# Patient Record
Sex: FEMALE | Race: WHITE | HISPANIC OR LATINO
[De-identification: names, ages, dates, MRNs, and addresses within clinical notes are randomized per-mention and may not be internally consistent; named-entity substitution may affect disease eponyms.]

---

## 2022-11-30 PROBLEM — Z00.00 ENCOUNTER FOR PREVENTIVE HEALTH EXAMINATION: Status: ACTIVE | Noted: 2022-11-30

## 2023-02-17 ENCOUNTER — APPOINTMENT (OUTPATIENT)
Dept: NEUROLOGY | Facility: CLINIC | Age: 78
End: 2023-02-17
Payer: MEDICARE

## 2023-02-17 ENCOUNTER — LABORATORY RESULT (OUTPATIENT)
Age: 78
End: 2023-02-17

## 2023-02-17 VITALS
BODY MASS INDEX: 19.12 KG/M2 | HEIGHT: 64 IN | HEART RATE: 68 BPM | OXYGEN SATURATION: 98 % | WEIGHT: 112 LBS | DIASTOLIC BLOOD PRESSURE: 108 MMHG | TEMPERATURE: 97.8 F | SYSTOLIC BLOOD PRESSURE: 178 MMHG

## 2023-02-17 DIAGNOSIS — I48.91 UNSPECIFIED ATRIAL FIBRILLATION: ICD-10-CM

## 2023-02-17 DIAGNOSIS — I10 ESSENTIAL (PRIMARY) HYPERTENSION: ICD-10-CM

## 2023-02-17 DIAGNOSIS — F32.A DEPRESSION, UNSPECIFIED: ICD-10-CM

## 2023-02-17 DIAGNOSIS — F03.90 UNSPECIFIED DEMENTIA W/OUT BEHAVIORAL DISTURBANCE: ICD-10-CM

## 2023-02-17 PROCEDURE — 99205 OFFICE O/P NEW HI 60 MIN: CPT

## 2023-02-17 RX ORDER — DONEPEZIL HYDROCHLORIDE 10 MG/1
10 TABLET ORAL
Qty: 90 | Refills: 2 | Status: ACTIVE | COMMUNITY
Start: 1900-01-01 | End: 1900-01-01

## 2023-02-17 RX ORDER — METOPROLOL SUCCINATE 50 MG/1
50 TABLET, EXTENDED RELEASE ORAL
Refills: 0 | Status: ACTIVE | COMMUNITY

## 2023-02-17 RX ORDER — PAROXETINE HYDROCHLORIDE 10 MG/1
10 TABLET, FILM COATED ORAL
Refills: 0 | Status: ACTIVE | COMMUNITY

## 2023-02-17 RX ORDER — CHROMIUM 200 MCG
TABLET ORAL
Refills: 0 | Status: ACTIVE | COMMUNITY

## 2023-02-17 RX ORDER — DIVALPROEX SODIUM 125 MG/1
125 CAPSULE, COATED PELLETS ORAL
Qty: 90 | Refills: 5 | Status: ACTIVE | COMMUNITY
Start: 2023-02-17 | End: 1900-01-01

## 2023-02-17 RX ORDER — APIXABAN 5 MG/1
5 TABLET, FILM COATED ORAL
Refills: 0 | Status: ACTIVE | COMMUNITY

## 2023-02-17 RX ORDER — ALENDRONATE SODIUM 70 MG/1
70 TABLET ORAL
Refills: 0 | Status: ACTIVE | COMMUNITY

## 2023-02-17 RX ORDER — LOSARTAN POTASSIUM 50 MG/1
50 TABLET, FILM COATED ORAL
Refills: 0 | Status: ACTIVE | COMMUNITY

## 2023-02-17 RX ORDER — MEMANTINE HYDROCHLORIDE 5 MG/1
5 TABLET, FILM COATED ORAL
Refills: 0 | Status: ACTIVE | COMMUNITY

## 2023-02-17 NOTE — PHYSICAL EXAM
[General Appearance - Alert] : alert [General Appearance - Well Nourished] : well nourished [General Appearance - Well Developed] : well developed [Affect] : the affect was normal [Person] : oriented to person [Place] : oriented to place [Time] : disoriented to time [Fluency] : fluency intact [Comprehension] : comprehension intact [Cranial Nerves Oculomotor (III)] : extraocular motion intact [Cranial Nerves Trigeminal (V)] : facial sensation intact symmetrically [Cranial Nerves Facial (VII)] : face symmetrical [Cranial Nerves Vestibulocochlear (VIII)] : hearing was intact bilaterally [Cranial Nerves Glossopharyngeal (IX)] : tongue and palate midline [Cranial Nerves Accessory (XI - Cranial And Spinal)] : head turning and shoulder shrug symmetric [Cranial Nerves Hypoglossal (XII)] : there was no tongue deviation with protrusion [Peripheral Vision Was Full To Confrontation Bilaterally] : peripheral vision was full to confrontation bilaterally [Motor Tone] : muscle tone was normal in all four extremities [Motor Strength] : muscle strength was normal in all four extremities [Paresis Pronator Drift Right-Sided] : no pronator drift on the right [Paresis Pronator Drift Left-Sided] : no pronator drift on the left [Sensation Tactile Decrease] : light touch was intact [Abnormal Walk] : normal gait [Coordination - Dysmetria Impaired Finger-to-Nose Bilateral] : not present [2+] : Patella left 2+ [FreeTextEntry4] : MoCA 8/30- uploaded in chart [Respiration, Rhythm And Depth] : normal respiratory rhythm and effort [Heart Sounds] : normal S1 and S2 [Arterial Pulses Carotid] : carotid pulses were normal with no bruits

## 2023-02-17 NOTE — REASON FOR VISIT
[Initial Eval - Existing Diagnosis] : an initial evaluation of an existing diagnosis [Family Member] : family member [FreeTextEntry1] : dementia

## 2023-02-17 NOTE — DISCUSSION/SUMMARY
[FreeTextEntry1] : Pt is a 76 yo F, primarily Yi-speaking, with PMhx of afib on eliquis, HTN, depression, dementia, s/p b/l cataract surgery and OD cornea transplant, who presents today with her granddaughter (who is translating) to establish care.\par \par # Dementia: likely AD vs vascular, MoCA 8/30, moderate-severe impairment. \par - MRI brain without contrast\par - Labs\par - Neuropsych testing\par - Increase donepezil to 10mg daily\par - Continue memantine 5mg daily\par - Start VPA sprinkles 125mg TID\par \par \par RTC in 4 mo

## 2023-02-17 NOTE — HISTORY OF PRESENT ILLNESS
[FreeTextEntry1] : Pt is a 76 yo F, primarily Lao-speaking, with PMhx of afib on eliquis, HTN, depression, dementia, s/p b/l cataract surgery and OD cornea transplant, who presents today with her granddaughter (who is translating) to establish care. She began showing signs of dementia about 4 years ago, was diagnosed in [place]. She has since been moved to lives in [place] with family due to declining mentation/memory. Poor short term memory, also forgetting familiar family members. She gets lost easily, has become more emotional and easily agitated. She fatigues easily, refuses help but granddaughter states that she does need help with ADL's. Does not use any ambulatory assist devices. She has been on aricept and memantine at current doses for a couple of years. Denies any recent falls. Occasional auditory hallucinations. No known h/o stroke or family h/o dementia.

## 2023-02-17 NOTE — REVIEW OF SYSTEMS
[Feeling Tired] : feeling tired [As Noted in HPI] : as noted in HPI [Change In Personality] : personality change [Eyesight Problems] : eyesight problems [Heart Rate Is Fast] : fast heart rate [SOB on Exertion] : shortness of breath during exertion [Negative] : Musculoskeletal

## 2023-02-21 LAB
ALBUMIN MFR SERPL ELPH: 55.8 %
ALBUMIN SERPL ELPH-MCNC: 4.6 G/DL
ALBUMIN SERPL-MCNC: 4.3 G/DL
ALBUMIN/GLOB SERPL: 1.3 RATIO
ALP BLD-CCNC: 128 U/L
ALPHA1 GLOB MFR SERPL ELPH: 4.7 %
ALPHA1 GLOB SERPL ELPH-MCNC: 0.4 G/DL
ALPHA2 GLOB MFR SERPL ELPH: 9 %
ALPHA2 GLOB SERPL ELPH-MCNC: 0.7 G/DL
ALT SERPL-CCNC: 15 U/L
ANION GAP SERPL CALC-SCNC: 12 MMOL/L
AST SERPL-CCNC: 28 U/L
B-GLOBULIN MFR SERPL ELPH: 11.3 %
B-GLOBULIN SERPL ELPH-MCNC: 0.9 G/DL
BILIRUB SERPL-MCNC: 0.5 MG/DL
BUN SERPL-MCNC: 19 MG/DL
CALCIUM SERPL-MCNC: 10 MG/DL
CHLORIDE SERPL-SCNC: 104 MMOL/L
CO2 SERPL-SCNC: 27 MMOL/L
CREAT SERPL-MCNC: 1 MG/DL
EGFR: 58 ML/MIN/1.73M2
FOLATE SERPL-MCNC: >20 NG/ML
GAMMA GLOB FLD ELPH-MCNC: 1.5 G/DL
GAMMA GLOB MFR SERPL ELPH: 19.2 %
GLUCOSE SERPL-MCNC: 88 MG/DL
INTERPRETATION SERPL IEP-IMP: NORMAL
POTASSIUM SERPL-SCNC: 3.8 MMOL/L
PROT SERPL-MCNC: 7.5 G/DL
PROT SERPL-MCNC: 7.7 G/DL
PROT SERPL-MCNC: 7.7 G/DL
SODIUM SERPL-SCNC: 143 MMOL/L
T PALLIDUM AB SER QL IA: NEGATIVE
T4 SERPL-MCNC: 5.5 UG/DL
TSH SERPL-ACNC: 1.26 UIU/ML
VIT B12 SERPL-MCNC: >2000 PG/ML

## 2023-03-01 LAB — VIT B1 SERPL-MCNC: 196.9 NMOL/L

## 2023-03-17 ENCOUNTER — EMERGENCY (EMERGENCY)
Facility: HOSPITAL | Age: 78
LOS: 0 days | Discharge: HOME | End: 2023-03-17
Attending: EMERGENCY MEDICINE
Payer: MEDICARE

## 2023-03-17 VITALS
RESPIRATION RATE: 12 BRPM | DIASTOLIC BLOOD PRESSURE: 58 MMHG | TEMPERATURE: 97 F | HEIGHT: 62 IN | OXYGEN SATURATION: 94 % | WEIGHT: 139.99 LBS | HEART RATE: 64 BPM | SYSTOLIC BLOOD PRESSURE: 101 MMHG

## 2023-03-17 VITALS
SYSTOLIC BLOOD PRESSURE: 99 MMHG | OXYGEN SATURATION: 95 % | RESPIRATION RATE: 14 BRPM | DIASTOLIC BLOOD PRESSURE: 61 MMHG | HEART RATE: 72 BPM

## 2023-03-17 DIAGNOSIS — M79.89 OTHER SPECIFIED SOFT TISSUE DISORDERS: ICD-10-CM

## 2023-03-17 DIAGNOSIS — M06.9 RHEUMATOID ARTHRITIS, UNSPECIFIED: ICD-10-CM

## 2023-03-17 DIAGNOSIS — M10.9 GOUT, UNSPECIFIED: ICD-10-CM

## 2023-03-17 DIAGNOSIS — E78.5 HYPERLIPIDEMIA, UNSPECIFIED: ICD-10-CM

## 2023-03-17 DIAGNOSIS — I10 ESSENTIAL (PRIMARY) HYPERTENSION: ICD-10-CM

## 2023-03-17 DIAGNOSIS — F03.90 UNSPECIFIED DEMENTIA, UNSPECIFIED SEVERITY, WITHOUT BEHAVIORAL DISTURBANCE, PSYCHOTIC DISTURBANCE, MOOD DISTURBANCE, AND ANXIETY: ICD-10-CM

## 2023-03-17 DIAGNOSIS — M25.531 PAIN IN RIGHT WRIST: ICD-10-CM

## 2023-03-17 PROCEDURE — 73130 X-RAY EXAM OF HAND: CPT | Mod: 26,RT

## 2023-03-17 PROCEDURE — 73130 X-RAY EXAM OF HAND: CPT | Mod: RT

## 2023-03-17 PROCEDURE — 73110 X-RAY EXAM OF WRIST: CPT | Mod: 26,RT

## 2023-03-17 PROCEDURE — 99284 EMERGENCY DEPT VISIT MOD MDM: CPT | Mod: GC

## 2023-03-17 PROCEDURE — 73090 X-RAY EXAM OF FOREARM: CPT | Mod: 26,RT

## 2023-03-17 PROCEDURE — 73110 X-RAY EXAM OF WRIST: CPT | Mod: RT

## 2023-03-17 PROCEDURE — 99284 EMERGENCY DEPT VISIT MOD MDM: CPT | Mod: 25

## 2023-03-17 PROCEDURE — 73090 X-RAY EXAM OF FOREARM: CPT | Mod: RT

## 2023-03-17 NOTE — ED ADULT NURSE NOTE - NSIMPLEMENTINTERV_GEN_ALL_ED
Implemented All Fall with Harm Risk Interventions:  [place] to call system. Call bell, personal items and telephone within reach. Instruct patient to call for assistance. Room bathroom lighting operational. Non-slip footwear when patient is off stretcher. Physically safe environment: no spills, clutter or unnecessary equipment. Stretcher in lowest position, wheels locked, appropriate side rails in place. Provide visual cue, wrist band, yellow gown, etc. Monitor gait and stability. Monitor for mental status changes and reorient to person, place, and time. Review medications for side effects contributing to fall risk. Reinforce activity limits and safety measures with patient and family. Provide visual clues: red socks.

## 2023-03-17 NOTE — ED PROVIDER NOTE - NSFOLLOWUPCLINICS_GEN_ALL_ED_FT
[facility]  Orthpedic  [address], [place]   Phone: [phone]  Fax:   Follow Up Time: 4-6 Days

## 2023-03-17 NOTE — ED PROVIDER NOTE - CARE PROVIDER_API CALL
Giovanny Shell)  Orthopaedic Surgery  [address] [place] [address]  Phone: [phone]  Fax: [phone]  Follow Up Time: 4-6 Days

## 2023-03-17 NOTE — ED PROVIDER NOTE - ATTENDING CONTRIBUTION TO CARE
76 yo F pmh of a fib, alzheimer, RA, HTN, HLD presents right swelling to the right wrist. As per nursing home they noted some swelling to the right wrist for the last week. Had an xray done that shows a possible fx so sent in for evaluation. Patient currently A&Ox3 but not sure how she may have injured her wrist. patient also currently on abx for a UTI.     CONSTITUTIONAL: Well-developed; well-nourished; in no acute distress.   SKIN: warm, dry  HEAD: Normocephalic; atraumatic.  EYES: PERRL, EOMI, no conjunctival erythema  ENT: No nasal discharge; airway clear.  NECK: Supple; non tender.  CARD: S1, S2 normal;  Regular rate and rhythm.   RESP: No wheezes, rales or rhonchi.  ABD: soft non tender, non distended, no rebound or guarding  EXT: + edema and tenderness to the medial aspect of the right wrist. 5/5 strength. 2+ puleses. Neurovascularly intact.   LYMPH: No acute cervical adenopathy.  NEURO: Alert, oriented, grossly unremarkable. neurovascularly intact  PSYCH: Cooperative, appropriate.

## 2023-03-17 NOTE — ED PROVIDER NOTE - PHYSICAL EXAMINATION
VITAL SIGNS: I have reviewed nursing notes and confirm.  CONSTITUTIONAL: well-appearing, non-toxic, NAD  SKIN: Warm dry, normal skin turgor  HEAD: NCAT  EYES: EOMI, PERRLA, no scleral icterus  ENT: Moist mucous membranes, normal pharynx with no erythema or exudates  NECK: Supple; non tender. Full ROM. No cervical LAD  CARD: RRR, no murmurs, rubs or gallops  RESP: clear to ausculation b/l.  No rales, rhonchi, or wheezing.  ABD: soft, + BS, non-tender, non-distended, no rebound or guarding. No CVA tenderness  EXT: Full ROM, no bony tenderness, no snuffbox tenderness bilaterally, no wrist swelling present, no pedal edema, no calf tenderness  NEURO: normal motor. normal sensory. CN II-XII intact. Cerebellar testing normal. Normal gait.  PSYCH: Cooperative, appropriate. Currently, AxOx4.

## 2023-03-17 NOTE — ED PROVIDER NOTE - NSFOLLOWUPINSTRUCTIONS_ED_ALL_ED_FT
SPLINT CARE - AfterCare(R) Instructions(ER/ED)     Splint Care    WHAT YOU NEED TO KNOW:    Splint care is important to help protect your splint until it comes off. Some splints are made of fiberglass or plaster that will need to dry and harden. Splint care will help the splint dry and harden correctly. Even after your splint hardens, it can be damaged.    DISCHARGE INSTRUCTIONS:    Return to the emergency department if:     You have increased pain.      Your fingers or toes are numb or tingling.      You feel burning or stinging around your injury.      Your nails, fingers, or toes turn pale, blue, or gray, and feel cold.      You have new or increased trouble moving your fingers or toes.      Your swelling gets worse.      The skin under your splint is bleeding or leaking pus.     Contact your healthcare provider if:     Your hard splint gets wet or is damaged.      You have a fever.      Your splint feels tighter.      You have itchy, dry skin under your splint that is getting worse.      The skin under your splint is red, or you have a new sore.      You notice a bad smell coming from your splint.       You have questions or concerns about your condition or care.    How to care for your splint:     Wait for your hard splint to harden completely. You may have to wait up to 3 days before you can walk on a plaster splint.      Check your splint and the skin around it each day. Check your splint for damage, such as cracks and breaks. Check your skin for redness, increased swelling, and sores. Loosen the elastic bandage around your splint if it feels too tight.      Keep your splint clean and dry. Keep dirt out of your splint. Before you bathe, wrap your hard splint with 2 layers of plastic. Then put a plastic bag over it. Keep the plastic bag tightly sealed. You can also ask your healthcare provider about waterproof shields. Do not put your hard splint in the water, even with a plastic bag over it. A wet splint can make your skin itchy, and may lead to infection.      Do not put powders or deodorants inside your splint. These can dry your skin and increase itching.       Do not try to scratch the skin inside your hard splint with sharp objects. Sharp objects can break off inside your splint or hurt your skin.       Do not pull the padding out of your splint. The padding inside your splint protects your skin. You may develop a sore on your skin if you take out the padding.    Follow up with your healthcare provider as directed within 1 to 2 weeks: Write down your questions so you remember to ask them during your visits.

## 2023-03-17 NOTE — ED PROVIDER NOTE - CLINICAL SUMMARY MEDICAL DECISION MAKING FREE TEXT BOX
Patient presents with swelling to her right wrist with concern for fracture.  X-ray done.  No fracture noted.  Ace wrap applied.  Discharged back to nursing home facility.

## 2023-03-17 NOTE — ED PROVIDER NOTE - PATIENT PORTAL LINK FT
You can access the FollowMyHealth Patient Portal offered by [facility] by registering at the following website: [email or web address]. By joining [employer]’s FollowMyHealth portal, you will also be able to view your health information using other applications (apps) compatible with our system.

## 2023-03-17 NOTE — ED PROVIDER NOTE - TOBACCO USE
----- Message from Jayla Perkins sent at 10/4/2019 11:38 AM EDT -----  Regarding: FW: follow up needed  Can you ladies help get patient in to see [facility] or Dr Pastor Mejias??? Thanks,    Sonu Oden   ----- Message -----  From: Sharri Harris  Sent: 10/4/2019  11:27 AM EDT  To: Jayla Perkins  Subject: FW: follow up needed                             See below    Can you help with this?  ----- Message -----  From: Teri Barrow MD  Sent: 10/4/2019  11:21 AM EDT  To: Juan Salas RN, Neurology [facility]  Subject: follow up needed                                 Please try to get patient in as soon as possible for follow up for botox  She has insurance again now and has had worsened headaches recently, currently at [facility] getting discharged today on medrol dose pack  She usually sees Dr Pastor Mejias but really anyone who does botox if her schedule is full and can revisit her again after if cannot be plugged in with her soon  Never smoker

## 2023-03-17 NOTE — ED PROVIDER NOTE - OBJECTIVE STATEMENT
77-year-old female with past medical history of dementia, A-fib, rheumatoid arthritis, HTN, HLD who presents for evaluation of right wrist.  Per nursing home notes, patient had right wrist swelling last week.  She had an x-ray done, negative for fracture.  Wrist was splinted at the time.  Patient was brought in for reevaluation of right wrist.  Denies fevers, chills, trauma, weakness, numbness, swelling.

## 2023-03-17 NOTE — ED ADULT TRIAGE NOTE - CHIEF COMPLAINT QUOTE
pt bibems from [facility] for right wrist fracture. [facility] xrayed bc they noticed deformity and swelling x 1 week, pt is baseline confused and poor historian. pt on eliquis. [facility] denies a fall or trauma

## 2023-03-20 ENCOUNTER — APPOINTMENT (OUTPATIENT)
Dept: ORTHOPEDIC SURGERY | Facility: CLINIC | Age: 78
End: 2023-03-20

## 2024-03-17 ENCOUNTER — INPATIENT (INPATIENT)
Facility: HOSPITAL | Age: 79
LOS: 3 days | Discharge: NURSING, REHAB OR LONG-TERM CARE | DRG: 312 | End: 2024-03-21
Attending: INTERNAL MEDICINE | Admitting: STUDENT IN AN ORGANIZED HEALTH CARE EDUCATION/TRAINING PROGRAM
Payer: MEDICARE

## 2024-03-17 VITALS
TEMPERATURE: 99 F | HEART RATE: 64 BPM | DIASTOLIC BLOOD PRESSURE: 78 MMHG | SYSTOLIC BLOOD PRESSURE: 148 MMHG | RESPIRATION RATE: 18 BRPM | OXYGEN SATURATION: 98 %

## 2024-03-17 DIAGNOSIS — A41.9 SEPSIS, UNSPECIFIED ORGANISM: ICD-10-CM

## 2024-03-17 LAB
ALBUMIN SERPL ELPH-MCNC: 4.2 G/DL — SIGNIFICANT CHANGE UP (ref 3.5–5.2)
ALP SERPL-CCNC: 118 U/L — HIGH (ref 30–115)
ALT FLD-CCNC: 13 U/L — SIGNIFICANT CHANGE UP (ref 0–41)
ANION GAP SERPL CALC-SCNC: 10 MMOL/L — SIGNIFICANT CHANGE UP (ref 7–14)
APPEARANCE UR: CLEAR — SIGNIFICANT CHANGE UP
APTT BLD: 40.2 SEC — HIGH (ref 27–39.2)
AST SERPL-CCNC: 23 U/L — SIGNIFICANT CHANGE UP (ref 0–41)
BACTERIA # UR AUTO: NEGATIVE /HPF — SIGNIFICANT CHANGE UP
BASOPHILS # BLD AUTO: 0.06 K/UL — SIGNIFICANT CHANGE UP (ref 0–0.2)
BASOPHILS NFR BLD AUTO: 0.6 % — SIGNIFICANT CHANGE UP (ref 0–1)
BILIRUB SERPL-MCNC: 0.6 MG/DL — SIGNIFICANT CHANGE UP (ref 0.2–1.2)
BILIRUB UR-MCNC: NEGATIVE — SIGNIFICANT CHANGE UP
BUN SERPL-MCNC: 19 MG/DL — SIGNIFICANT CHANGE UP (ref 10–20)
CALCIUM SERPL-MCNC: 10.5 MG/DL — SIGNIFICANT CHANGE UP (ref 8.4–10.5)
CAST: 0 /LPF — SIGNIFICANT CHANGE UP (ref 0–4)
CHLORIDE SERPL-SCNC: 106 MMOL/L — SIGNIFICANT CHANGE UP (ref 98–110)
CO2 SERPL-SCNC: 25 MMOL/L — SIGNIFICANT CHANGE UP (ref 17–32)
COLOR SPEC: YELLOW — SIGNIFICANT CHANGE UP
CREAT SERPL-MCNC: 1.1 MG/DL — SIGNIFICANT CHANGE UP (ref 0.7–1.5)
DIFF PNL FLD: NEGATIVE — SIGNIFICANT CHANGE UP
EGFR: 52 ML/MIN/1.73M2 — LOW
EOSINOPHIL # BLD AUTO: 0.31 K/UL — SIGNIFICANT CHANGE UP (ref 0–0.7)
EOSINOPHIL NFR BLD AUTO: 3.4 % — SIGNIFICANT CHANGE UP (ref 0–8)
GLUCOSE BLDC GLUCOMTR-MCNC: 100 MG/DL — HIGH (ref 70–99)
GLUCOSE SERPL-MCNC: 89 MG/DL — SIGNIFICANT CHANGE UP (ref 70–99)
GLUCOSE UR QL: NEGATIVE MG/DL — SIGNIFICANT CHANGE UP
HCT VFR BLD CALC: 34.8 % — LOW (ref 37–47)
HGB BLD-MCNC: 11.1 G/DL — LOW (ref 12–16)
IMM GRANULOCYTES NFR BLD AUTO: 0.3 % — SIGNIFICANT CHANGE UP (ref 0.1–0.3)
INR BLD: 1.51 RATIO — HIGH (ref 0.65–1.3)
KETONES UR-MCNC: NEGATIVE MG/DL — SIGNIFICANT CHANGE UP
LACTATE SERPL-SCNC: 1 MMOL/L — SIGNIFICANT CHANGE UP (ref 0.7–2)
LEUKOCYTE ESTERASE UR-ACNC: ABNORMAL
LIDOCAIN IGE QN: 55 U/L — SIGNIFICANT CHANGE UP (ref 7–60)
LYMPHOCYTES # BLD AUTO: 2.01 K/UL — SIGNIFICANT CHANGE UP (ref 1.2–3.4)
LYMPHOCYTES # BLD AUTO: 21.7 % — SIGNIFICANT CHANGE UP (ref 20.5–51.1)
MCHC RBC-ENTMCNC: 30.2 PG — SIGNIFICANT CHANGE UP (ref 27–31)
MCHC RBC-ENTMCNC: 31.9 G/DL — LOW (ref 32–37)
MCV RBC AUTO: 94.6 FL — SIGNIFICANT CHANGE UP (ref 81–99)
MONOCYTES # BLD AUTO: 0.91 K/UL — HIGH (ref 0.1–0.6)
MONOCYTES NFR BLD AUTO: 9.8 % — HIGH (ref 1.7–9.3)
NEUTROPHILS # BLD AUTO: 5.93 K/UL — SIGNIFICANT CHANGE UP (ref 1.4–6.5)
NEUTROPHILS NFR BLD AUTO: 64.2 % — SIGNIFICANT CHANGE UP (ref 42.2–75.2)
NITRITE UR-MCNC: NEGATIVE — SIGNIFICANT CHANGE UP
NRBC # BLD: 0 /100 WBCS — SIGNIFICANT CHANGE UP (ref 0–0)
PH UR: 7.5 — SIGNIFICANT CHANGE UP (ref 5–8)
PLATELET # BLD AUTO: 170 K/UL — SIGNIFICANT CHANGE UP (ref 130–400)
PMV BLD: 11 FL — HIGH (ref 7.4–10.4)
POTASSIUM SERPL-MCNC: 4 MMOL/L — SIGNIFICANT CHANGE UP (ref 3.5–5)
POTASSIUM SERPL-SCNC: 4 MMOL/L — SIGNIFICANT CHANGE UP (ref 3.5–5)
PROT SERPL-MCNC: 7.6 G/DL — SIGNIFICANT CHANGE UP (ref 6–8)
PROT UR-MCNC: NEGATIVE MG/DL — SIGNIFICANT CHANGE UP
PROTHROM AB SERPL-ACNC: 17.3 SEC — HIGH (ref 9.95–12.87)
RBC # BLD: 3.68 M/UL — LOW (ref 4.2–5.4)
RBC # FLD: 13.4 % — SIGNIFICANT CHANGE UP (ref 11.5–14.5)
RBC CASTS # UR COMP ASSIST: 1 /HPF — SIGNIFICANT CHANGE UP (ref 0–4)
SODIUM SERPL-SCNC: 141 MMOL/L — SIGNIFICANT CHANGE UP (ref 135–146)
SP GR SPEC: 1.01 — SIGNIFICANT CHANGE UP (ref 1–1.03)
SQUAMOUS # UR AUTO: 1 /HPF — SIGNIFICANT CHANGE UP (ref 0–5)
TROPONIN T, HIGH SENSITIVITY RESULT: 10 NG/L — SIGNIFICANT CHANGE UP (ref 6–13)
TROPONIN T, HIGH SENSITIVITY RESULT: 9 NG/L — SIGNIFICANT CHANGE UP (ref 6–13)
UROBILINOGEN FLD QL: 0.2 MG/DL — SIGNIFICANT CHANGE UP (ref 0.2–1)
WBC # BLD: 9.25 K/UL — SIGNIFICANT CHANGE UP (ref 4.8–10.8)
WBC # FLD AUTO: 9.25 K/UL — SIGNIFICANT CHANGE UP (ref 4.8–10.8)
WBC UR QL: 11 /HPF — HIGH (ref 0–5)

## 2024-03-17 PROCEDURE — 97530 THERAPEUTIC ACTIVITIES: CPT | Mod: GP

## 2024-03-17 PROCEDURE — 71260 CT THORAX DX C+: CPT | Mod: 26,MC

## 2024-03-17 PROCEDURE — 80048 BASIC METABOLIC PNL TOTAL CA: CPT

## 2024-03-17 PROCEDURE — 86803 HEPATITIS C AB TEST: CPT

## 2024-03-17 PROCEDURE — 99285 EMERGENCY DEPT VISIT HI MDM: CPT

## 2024-03-17 PROCEDURE — 97162 PT EVAL MOD COMPLEX 30 MIN: CPT | Mod: GP

## 2024-03-17 PROCEDURE — 74177 CT ABD & PELVIS W/CONTRAST: CPT | Mod: 26,MC

## 2024-03-17 PROCEDURE — 85025 COMPLETE CBC W/AUTO DIFF WBC: CPT

## 2024-03-17 PROCEDURE — 93010 ELECTROCARDIOGRAM REPORT: CPT

## 2024-03-17 PROCEDURE — 95819 EEG AWAKE AND ASLEEP: CPT

## 2024-03-17 PROCEDURE — 84443 ASSAY THYROID STIM HORMONE: CPT

## 2024-03-17 PROCEDURE — 87086 URINE CULTURE/COLONY COUNT: CPT

## 2024-03-17 PROCEDURE — 87186 SC STD MICRODIL/AGAR DIL: CPT

## 2024-03-17 PROCEDURE — 72125 CT NECK SPINE W/O DYE: CPT | Mod: 26,MC

## 2024-03-17 PROCEDURE — 83735 ASSAY OF MAGNESIUM: CPT

## 2024-03-17 PROCEDURE — 97116 GAIT TRAINING THERAPY: CPT | Mod: GP

## 2024-03-17 PROCEDURE — 70450 CT HEAD/BRAIN W/O DYE: CPT | Mod: 26,MC

## 2024-03-17 PROCEDURE — 36415 COLL VENOUS BLD VENIPUNCTURE: CPT

## 2024-03-17 PROCEDURE — 71045 X-RAY EXAM CHEST 1 VIEW: CPT | Mod: 26

## 2024-03-17 PROCEDURE — 80053 COMPREHEN METABOLIC PANEL: CPT

## 2024-03-17 PROCEDURE — 87635 SARS-COV-2 COVID-19 AMP PRB: CPT

## 2024-03-17 PROCEDURE — 85027 COMPLETE CBC AUTOMATED: CPT

## 2024-03-17 PROCEDURE — 72170 X-RAY EXAM OF PELVIS: CPT | Mod: 26

## 2024-03-17 RX ORDER — HALOPERIDOL DECANOATE 100 MG/ML
2 INJECTION INTRAMUSCULAR ONCE
Refills: 0 | Status: COMPLETED | OUTPATIENT
Start: 2024-03-17 | End: 2024-03-17

## 2024-03-17 NOTE — ED PROVIDER NOTE - PHYSICAL EXAMINATION
CONSTITUTIONAL: elderly appearing  SKIN: Warm dry, normal skin turgor  HEAD: NCAT  EYES: EOMI, PERRLA, no scleral icterus, conjunctiva pink  ENT: normal pharynx with no erythema or exudates  NECK: Supple; non tender. Full ROM.  CARD: RRR, no murmurs.  RESP: clear to ausculation b/l. No crackles or wheezing.  ABD: soft, llq TTP, no rebound or guarding.  EXT: Full ROM, left Hip TTP, no pedal edema, no calf tenderness  NEURO: normal motor. normal sensory.   PSYCH: Cooperative, appropriate.

## 2024-03-17 NOTE — ED PROVIDER NOTE - PROGRESS NOTE DETAILS
pk: trauma alert called on arrival to the ED, pk: Patient repeatedly trying to get out of stretcher not re-directable, states she is trying to leave the hospital. Haldol given for sedation as pt does not demonstrate understanding of situation. PS: Pt cleared by trauma. Will admit to tele for syncope work up

## 2024-03-17 NOTE — CONSULT NOTE ADULT - SUBJECTIVE AND OBJECTIVE BOX
TRAUMA ACTIVATION LEVEL:  CODE / ALERT  / CONSULT  ACTIVATED BY: EMS /  ED  INTUBATED: YES / NO      MECHANISM OF INJURY:   [] Blunt     [] MVC	  [XX] Fall	  [] Pedestrian Struck	  [] Motorcycle         GCS: 15 	E: 4	V: 5	M: 6    HPI:    78yF w/ PMHx of dementia, AFib, RA, HTN, HLD seen as a trauma alert s/p mechanical fall on AC in [facility] +HT, ?LOC, +AC. Fall was unwitnessed. Patient not complaining of pain. She was unable to get herself up after the fall and was unable to ambulate. Trauma assessment in ED: ABCs intact , GCS 15 , AAOx2 (baseline).    PAST MEDICAL & SURGICAL HISTORY:      Allergies  Decadron (Unknown)  Intolerances      Home Medications:      ROS: 10-system review is otherwise negative except HPI above.      Primary Survey:    A - airway intact  B - bilateral breath sounds and good chest rise  C - palpable pulses in all extremities  D - GCS 15 on arrival, HO  Exposure obtained    Vital Signs Last 24 Hrs  T(C): 37 (17 Mar 2024 15:32), Max: 37.1 (17 Mar 2024 15:24)  T(F): 98.6 (17 Mar 2024 15:32), Max: 98.8 (17 Mar 2024 15:24)  HR: 84 (17 Mar 2024 15:38) (64 - 89)  BP: 131/79 (17 Mar 2024 15:38) (131/79 - 148/78)  BP(mean): --  RR: 16 (17 Mar 2024 15:38) (16 - 18)  SpO2: 96% (17 Mar 2024 15:38) (96% - 98%)    Parameters below as of 17 Mar 2024 15:38  Patient On (Oxygen Delivery Method): room air      Secondary Survey:   General: NAD  HEENT: Normocephalic, atraumatic, EOMI, PEERLA. no scalp lacerations   Neck: Soft, midline trachea. no c-spine tenderness, c collar in place   Chest: No chest wall tenderness, no subcutaneous emphysema   Cardiac: S1, S2, RRR  Respiratory: Bilateral breath sounds, clear and equal bilaterally  Abdomen: Soft, non-distended, non-tender, no rebound, no guarding.  Groin: Normal appearing, pelvis stable   Ext:  Moving b/l upper and lower extremities. Palpable Radial b/l UE, b/l DP palpable in LE.   Back: No T/L/S spine tenderness on trauma exam, No palpable runoff/stepoff/deformity        Labs:  CAPILLARY BLOOD GLUCOSE               11.1   9.25  )-----------( 170      ( 17 Mar 2024 15:58 )             34.8       Auto Neutrophil %: 64.2 % (03-17-24 @ 15:58)  Auto Immature Granulocyte %: 0.3 % (03-17-24 @ 15:58)    03-17    141  |  106  |  19  ----------------------------<  89  4.0   |  25  |  1.1      Calcium: 10.5 mg/dL (03-17-24 @ 15:58)      LFTs:             7.6  | 0.6  | 23       ------------------[118     ( 17 Mar 2024 15:58 )  4.2  | x    | 13          Lipase:55     Amylase:x         Lactate, Blood: 1.0 mmol/L (03-17-24 @ 15:58)      Coags:     17.30  ----< 1.51    ( 17 Mar 2024 15:58 )     40.2        Urinalysis Basic - ( 17 Mar 2024 15:58 )    Color: x / Appearance: x / SG: x / pH: x  Gluc: 89 mg/dL / Ketone: x  / Bili: x / Urobili: x   Blood: x / Protein: x / Nitrite: x   Leuk Esterase: x / RBC: x / WBC x   Sq Epi: x / Non Sq Epi: x / Bacteria: x      RADIOLOGY & ADDITIONAL STUDIES:  ***pending panscan  ---------------------------------------------------------------------------------------     TRAUMA ACTIVATION LEVEL:  CODE / ALERT  / CONSULT  ACTIVATED BY: EMS /  ED  INTUBATED: YES / NO      MECHANISM OF INJURY:   [] Blunt     [] MVC	  [XX] Fall	  [] Pedestrian Struck	  [] Motorcycle         GCS: 15 	E: 4	V: 5	M: 6    HPI:    78yF w/ PMHx of dementia, AFib, RA, HTN, HLD seen as a trauma alert s/p mechanical fall on AC in [facility] +HT, ?LOC, +AC. Fall was unwitnessed. Patient not complaining of pain. She was unable to get herself up after the fall and was unable to ambulate. Trauma assessment in ED: ABCs intact , GCS 15 , AAOx2 (baseline).    PAST MEDICAL & SURGICAL HISTORY:      Allergies  Decadron (Unknown)  Intolerances      Home Medications:      ROS: 10-system review is otherwise negative except HPI above.      Primary Survey:    A - airway intact  B - bilateral breath sounds and good chest rise  C - palpable pulses in all extremities  D - GCS 15 on arrival, HO  Exposure obtained    Vital Signs Last 24 Hrs  T(C): 37 (17 Mar 2024 15:32), Max: 37.1 (17 Mar 2024 15:24)  T(F): 98.6 (17 Mar 2024 15:32), Max: 98.8 (17 Mar 2024 15:24)  HR: 84 (17 Mar 2024 15:38) (64 - 89)  BP: 131/79 (17 Mar 2024 15:38) (131/79 - 148/78)  BP(mean): --  RR: 16 (17 Mar 2024 15:38) (16 - 18)  SpO2: 96% (17 Mar 2024 15:38) (96% - 98%)    Parameters below as of 17 Mar 2024 15:38  Patient On (Oxygen Delivery Method): room air      Secondary Survey:   General: NAD  HEENT: Normocephalic, atraumatic, EOMI, PEERLA. no scalp lacerations   Neck: Soft, midline trachea. no c-spine tenderness, c collar in place   Chest: No chest wall tenderness, no subcutaneous emphysema   Cardiac: S1, S2, RRR  Respiratory: Bilateral breath sounds, clear and equal bilaterally  Abdomen: Soft, non-distended, non-tender, no rebound, no guarding.  Groin: Normal appearing, pelvis stable   Ext:  Moving b/l upper and lower extremities. Palpable Radial b/l UE, b/l DP palpable in LE.   Back: No T/L/S spine tenderness on trauma exam, No palpable runoff/stepoff/deformity        Labs:  CAPILLARY BLOOD GLUCOSE               11.1   9.25  )-----------( 170      ( 17 Mar 2024 15:58 )             34.8       Auto Neutrophil %: 64.2 % (03-17-24 @ 15:58)  Auto Immature Granulocyte %: 0.3 % (03-17-24 @ 15:58)    03-17    141  |  106  |  19  ----------------------------<  89  4.0   |  25  |  1.1      Calcium: 10.5 mg/dL (03-17-24 @ 15:58)      LFTs:             7.6  | 0.6  | 23       ------------------[118     ( 17 Mar 2024 15:58 )  4.2  | x    | 13          Lipase:55     Amylase:x         Lactate, Blood: 1.0 mmol/L (03-17-24 @ 15:58)      Coags:     17.30  ----< 1.51    ( 17 Mar 2024 15:58 )     40.2        Urinalysis Basic - ( 17 Mar 2024 15:58 )    Color: x / Appearance: x / SG: x / pH: x  Gluc: 89 mg/dL / Ketone: x  / Bili: x / Urobili: x   Blood: x / Protein: x / Nitrite: x   Leuk Esterase: x / RBC: x / WBC x   Sq Epi: x / Non Sq Epi: x / Bacteria: x      RADIOLOGY & ADDITIONAL STUDIES:  < from: CT Abdomen and Pelvis w/ IV Cont (03.17.24 @ 17:36) >  IMPRESSION:  No pulmonary emboli.  :1.4 cm left thyroid nodule with calcification. 1.8 x 0.9 cm prevascular   mediastinal nodule (3/44).  1.9 x 1.4 x 5.5 cm pleural-based opacity, medial right lower lobe is   present (series 3/58, 9/231). No pleural effusions or pneumothorax.   Follow-up CT scan in 3-6 months time recommended.  T4 age indeterminate compression deformity.. Correlate with clinical   symptoms.  Healing fracture right ninth rib with callus formation (4/207)      < from: Xray Hand 3 Views, Right (03.17.23 @ 15:33) >  IMPRESSION:  1. Osteopenia without acute osseous abnormality.  2. Degenerative change as above.      < from: Xray Forearm, Right (03.17.23 @ 15:33) >  IMPRESSION:  1. Osteopenia without acute osseous abnormality.  2. Degenerative change as above.    < from: Xray Wrist 3 Views, Right (03.17.23 @ 15:34) >  IMPRESSION:  1. Osteopenia without acute osseous abnormality.  2. Degenerative change as above.      < from: CT Head No Cont (03.17.24 @ 17:27) >  CT HEAD:  No acute intracranial pathology or hemorrhage. No acute calvarial   fracture.  Moderate chronic microvascular type changes as well as a chronic right   external capsule lacunar infarct.      < from: CT Cervical Spine No Cont (03.17.24 @ 17:36) >  CT CERVICAL SPINE:  No acute fracture or subluxation.    < from: CT Chest w/ IV Cont (03.17.24 @ 17:36) >  IMPRESSION:  No pulmonary emboli.  :1.4 cm left thyroid nodule with calcification. 1.8 x 0.9 cm prevascular   mediastinal nodule (3/44).  1.9 x 1.4 x 5.5 cm pleural-based opacity, medial right lower lobe is   present (series 3/58, 9/231). No pleural effusions or pneumothorax.   Follow-up CT scan in 3-6 months time recommended.  T4 age indeterminate compression deformity.. Correlate with clinical   symptoms.  Healing fracture right ninth rib with callus formation (4/207)      ---------------------------------------------------------------------------------------     TRAUMA ACTIVATION LEVEL:  CODE / ALERT  / CONSULT  ACTIVATED BY: EMS /  ED  INTUBATED: YES / NO      MECHANISM OF INJURY:   [] Blunt     [] MVC	  [XX] Fall	  [] Pedestrian Struck	  [] Motorcycle         GCS: 15 	E: 4	V: 5	M: 6    HPI:    78yF w/ PMHx of dementia, AFib, RA, HTN, HLD seen as a trauma alert s/p mechanical fall on AC in [facility] +HT, ?LOC, +AC. Fall was unwitnessed. Patient not complaining of pain. She was unable to get herself up after the fall and was unable to ambulate. Trauma assessment in ED: ABCs intact , GCS 15 , AAOx2 (baseline).    PAST MEDICAL & SURGICAL HISTORY:  atrial fibrillation, rheumatoid arthritis, hypertension, hyperlipidemia    Allergies  Decadron (Unknown)  Intolerances      Home Medications:  Eliquis    ROS: 10-system review is otherwise negative except HPI above.      Primary Survey:    A - airway intact  B - bilateral breath sounds and good chest rise  C - palpable pulses in all extremities  D - GCS 15 on arrival, HO  Exposure obtained    Vital Signs Last 24 Hrs  T(C): 37 (17 Mar 2024 15:32), Max: 37.1 (17 Mar 2024 15:24)  T(F): 98.6 (17 Mar 2024 15:32), Max: 98.8 (17 Mar 2024 15:24)  HR: 84 (17 Mar 2024 15:38) (64 - 89)  BP: 131/79 (17 Mar 2024 15:38) (131/79 - 148/78)  BP(mean): --  RR: 16 (17 Mar 2024 15:38) (16 - 18)  SpO2: 96% (17 Mar 2024 15:38) (96% - 98%)    Parameters below as of 17 Mar 2024 15:38  Patient On (Oxygen Delivery Method): room air      Secondary Survey:   General: NAD  HEENT: Normocephalic, atraumatic, EOMI, PEERLA. no scalp lacerations   Neck: Soft, midline trachea. no c-spine tenderness, c collar in place   Chest: No chest wall tenderness, no subcutaneous emphysema   Cardiac: S1, S2, RRR  Respiratory: Bilateral breath sounds, clear and equal bilaterally  Abdomen: Soft, non-distended, non-tender, no rebound, no guarding.  Groin: Normal appearing, pelvis stable   Ext:  Moving b/l upper and lower extremities. Palpable Radial b/l UE, b/l DP palpable in LE.   Back: No T/L/S spine tenderness on trauma exam, No palpable runoff/stepoff/deformity        Labs:  CAPILLARY BLOOD GLUCOSE               11.1   9.25  )-----------( 170      ( 17 Mar 2024 15:58 )             34.8       Auto Neutrophil %: 64.2 % (03-17-24 @ 15:58)  Auto Immature Granulocyte %: 0.3 % (03-17-24 @ 15:58)    03-17    141  |  106  |  19  ----------------------------<  89  4.0   |  25  |  1.1      Calcium: 10.5 mg/dL (03-17-24 @ 15:58)      LFTs:             7.6  | 0.6  | 23       ------------------[118     ( 17 Mar 2024 15:58 )  4.2  | x    | 13          Lipase:55     Amylase:x         Lactate, Blood: 1.0 mmol/L (03-17-24 @ 15:58)      Coags:     17.30  ----< 1.51    ( 17 Mar 2024 15:58 )     40.2        Urinalysis Basic - ( 17 Mar 2024 15:58 )    Color: x / Appearance: x / SG: x / pH: x  Gluc: 89 mg/dL / Ketone: x  / Bili: x / Urobili: x   Blood: x / Protein: x / Nitrite: x   Leuk Esterase: x / RBC: x / WBC x   Sq Epi: x / Non Sq Epi: x / Bacteria: x      RADIOLOGY & ADDITIONAL STUDIES:  < from: CT Abdomen and Pelvis w/ IV Cont (03.17.24 @ 17:36) >  IMPRESSION:  No pulmonary emboli.  :1.4 cm left thyroid nodule with calcification. 1.8 x 0.9 cm prevascular   mediastinal nodule (3/44).  1.9 x 1.4 x 5.5 cm pleural-based opacity, medial right lower lobe is   present (series 3/58, 9/231). No pleural effusions or pneumothorax.   Follow-up CT scan in 3-6 months time recommended.  T4 age indeterminate compression deformity.. Correlate with clinical   symptoms.  Healing fracture right ninth rib with callus formation (4/207)      < from: Xray Hand 3 Views, Right (03.17.23 @ 15:33) >  IMPRESSION:  1. Osteopenia without acute osseous abnormality.  2. Degenerative change as above.      < from: Xray Forearm, Right (03.17.23 @ 15:33) >  IMPRESSION:  1. Osteopenia without acute osseous abnormality.  2. Degenerative change as above.    < from: Xray Wrist 3 Views, Right (03.17.23 @ 15:34) >  IMPRESSION:  1. Osteopenia without acute osseous abnormality.  2. Degenerative change as above.      < from: CT Head No Cont (03.17.24 @ 17:27) >  CT HEAD:  No acute intracranial pathology or hemorrhage. No acute calvarial   fracture.  Moderate chronic microvascular type changes as well as a chronic right   external capsule lacunar infarct.      < from: CT Cervical Spine No Cont (03.17.24 @ 17:36) >  CT CERVICAL SPINE:  No acute fracture or subluxation.    < from: CT Chest w/ IV Cont (03.17.24 @ 17:36) >  IMPRESSION:  No pulmonary emboli.  :1.4 cm left thyroid nodule with calcification. 1.8 x 0.9 cm prevascular   mediastinal nodule (3/44).  1.9 x 1.4 x 5.5 cm pleural-based opacity, medial right lower lobe is   present (series 3/58, 9/231). No pleural effusions or pneumothorax.   Follow-up CT scan in 3-6 months time recommended.  T4 age indeterminate compression deformity.. Correlate with clinical   symptoms.  Healing fracture right ninth rib with callus formation (4/207)      ---------------------------------------------------------------------------------------

## 2024-03-17 NOTE — ED ADULT NURSE NOTE - NSFALLRISKINTERV_ED_ALL_ED
Assistance OOB with selected safe patient handling equipment if applicable/Assistance with ambulation/Communicate fall risk and risk factors to all staff, patient, and family/Monitor gait and stability/Provide visual cue: yellow wristband, yellow gown, etc/Reinforce activity limits and safety measures with patient and family/Call bell, personal items and telephone in reach/Instruct patient to call for assistance before getting out of bed/chair/stretcher/Non-slip footwear applied when patient is off stretcher/[place] to call system/Physically safe environment - no spills, clutter or unnecessary equipment/Purposeful Proactive Rounding/Room/bathroom lighting operational, light cord in reach

## 2024-03-17 NOTE — ED ADULT NURSE NOTE - CHPI ED NUR SYMPTOMS NEG
no abrasion/no bleeding/no deformity/no fever/no loss of consciousness/no numbness/no vomiting/no weakness

## 2024-03-17 NOTE — ED PROVIDER NOTE - CLINICAL SUMMARY MEDICAL DECISION MAKING FREE TEXT BOX
78-year-old female with history of dementia, A-fib on Eliquis, oriented x 2 in the ED brought in by EMS from [facility] after an unwitnessed fall. Exam as documented. labs unremarkable. EKG independently interpreted by me Dr. Tamez showing a-fib, rate controlled. cxr images independently interpreted by me Dr. Tamez showing no focal opacities. Trauma work up negative. cleared by trauma. admitted for further management.

## 2024-03-17 NOTE — PATIENT PROFILE ADULT - FALL HARM RISK - HARM RISK INTERVENTIONS
Assistance with ambulation/Assistance OOB with selected safe patient handling equipment/Communicate Risk of Fall with Harm to all staff/Discuss with provider need for PT consult/Monitor for mental status changes/Monitor gait and stability/Move patient closer to nurses' station/Provide patient with walking aids - walker, cane, crutches/Reinforce activity limits and safety measures with patient and family/Reorient to person, place and time as needed/Tailored Fall Risk Interventions/Toileting schedule using arm’s reach rule for commode and bathroom/Use of alarms - bed, chair and/or voice tab/Visual Cue: Yellow wristband and red socks/Bed in lowest position, wheels locked, appropriate side rails in place/Call bell, personal items and telephone in reach/Instruct patient to call for assistance before getting out of bed or chair/Non-slip footwear when patient is out of bed/[place] to call system/Physically safe environment - no spills, clutter or unnecessary equipment/Purposeful Proactive Rounding/Room/bathroom lighting operational, light cord in reach

## 2024-03-17 NOTE — ED ADULT NURSE NOTE - NSSEPSISNEWALTERMENTAL_ED_A_ED
Details (Free Text): Photographs were obtained today for clinical monitoring of nevi. Detail Level: Zone No

## 2024-03-17 NOTE — ED PROVIDER NOTE - OBJECTIVE STATEMENT
Patient is a 78y PMHx of dementia, AFib on eliquis, RA, HTN, HLD seen as a trauma alert s/p mechanical fall at [facility]. Patient states she was trying to reach of an object when the next thing she remembers is waking up on the floor. Nursing staff heard a thud and found patient awake and alert on the floor. Patient A&Ox2 complaining of left sided abd pain and leg pain. Otherwise denies any fever, chills, headache, changes in vision, cough, congestion, cp, palpitations, sob, n/v/d, constipation, urinary complaints.

## 2024-03-17 NOTE — ED ADULT NURSE NOTE - OBJECTIVE STATEMENT
Pt presents to ED S/P unwitnessed fall at nursing home. As per , pt states she does not know how she fell but reports left sided hip and shoulder pain.

## 2024-03-17 NOTE — CONSULT NOTE ADULT - ASSESSMENT
ASSESSMENT:  78yF w/ PMHx of dementia, AFib, RA, HTN, HLD seen as a trauma alert s/p mechanical fall on AC in [facility] +HT, ?LOC, +AC. Fall was unwitnessed. Patient not complaining of pain. She was unable to get herself up after the fall and was unable to ambulate. Trauma assessment in ED: ABCs intact , GCS 15 , AAOx2 (baseline).      Injuries identified:   -       PLAN:   - Trauma Labs: (CBC, BMP, Coags, T&S, UA, EtOH level)  Additional studies:  EKG  Utox    Trauma Imaging to include the following:  - CXR, Pelvic Xray  - CT Head,  CT C-spine, CT Chest, CT Abd/Pelvis      Additional consultations:      Disposition pending results of above labs and imaging  Above plan discussed with Trauma attending, Dr. Pink  , patient, patient family, and ED team  --------------------------------------------------------------------------------------  03-17-24 @ 16:31 ASSESSMENT:  78yF w/ PMHx of dementia, AFib, RA, HTN, HLD seen as a trauma alert s/p mechanical fall on AC in [facility] +HT, ?LOC, +AC. Fall was unwitnessed. Patient not complaining of pain. She was unable to get herself up after the fall and was unable to ambulate. Trauma assessment in ED: ABCs intact , GCS 15 , AAOx2 (baseline).       Injuries identified:   - No acute traumatic injuries       PLAN:   - No acute traumatic injuries   - Cleared from trauma perspective   - If admitted, will see patient tomorrow for a tertiary exam   - Dispo per ED     Disposition pending results of above labs and imaging  Above plan discussed with Trauma attending, Dr. Pink  , patient, patient family, and ED team  --------------------------------------------------------------------------------------  03-17-24 @ 16:31

## 2024-03-17 NOTE — ED PROVIDER NOTE - ATTENDING CONTRIBUTION TO CARE
78-year-old female with history of dementia, A-fib on Eliquis, oriented x 2 in the ED brought in by EMS from [facility] after an unwitnessed fall.  Patient does not remember how she fell.  Denies chest pain shortness of breath fevers vomiting or diarrhea.  Patient with hematoma to the posterior scalp, tenderness to left ribs.  Lungs clear to auscultation bilaterally.  Tenderness to the left upper quadrant as well as left lower quadrant.  Patient is able to range her lower extremities.  No deformities noted to the extremities.  Pulses intact all 4 extremities.  On my assessment patient with midline thoracic spine tenderness.  No ecchymosis to the flank.    Trauma alert upon arrival.   Trauma bedside.

## 2024-03-17 NOTE — ED ADULT TRIAGE NOTE - CHIEF COMPLAINT QUOTE
keron ems from [facility]. tripped and fell hit the back of her head on Eliquis. as per em pt at baseline mental status

## 2024-03-17 NOTE — CONSULT NOTE ADULT - ATTENDING COMMENTS
Trauma Attending Note Attestation    Patient was examined and evaluated at the bedside at 3:35 PM. Medications, radiological studies and all other relevant studies reviewed.     78y Female with PMH dementia, afib on eliquis, HTN s/p unwitnessed fall at nursing home. Unable to get off floor after fall. Per report. nursing heard thud and found patient on floor. Patient unsure what happened. Complaining of L chest and left-sided abdominal pain. No SOB, nausea, vomiting, headache.    Vital Signs Last 24 Hrs  T(C): 37 (17 Mar 2024 15:32), Max: 37.1 (17 Mar 2024 15:24)  T(F): 98.6 (17 Mar 2024 15:32), Max: 98.8 (17 Mar 2024 15:24)  HR: 84 (17 Mar 2024 15:38) (64 - 89)  BP: 131/79 (17 Mar 2024 15:38) (131/79 - 148/78)  BP(mean): --  RR: 16 (17 Mar 2024 15:38) (16 - 18)  SpO2: 96% (17 Mar 2024 15:38) (96% - 98%)    Parameters below as of 17 Mar 2024 15:38  Patient On (Oxygen Delivery Method): room air    Primary:  Airway - intact  Breathing - breath sounds bilaterally  Circulation - 2+ throughout  Disability - GCS 15, moving all extremities  Exposure - patient was exposed    I independently performed a medically appropriate exam. I have made revisions to the physical exam in the note above and agree with the exam as written.                          11.1   9.25  )-----------( 170      ( 17 Mar 2024 15:58 )             34.8     03-17    141  |  106  |  19  ----------------------------<  89  4.0   |  25  |  1.1    Ca 10.5; Mg x ; Phos x       ( 17 Mar 2024 15:58 )  Alb: 4.2 g/dL / Pro: 7.6 g/dL / AlkPhos: 118 U/L / ALT: 13 U/L / AST: 23 U/L / GGT:x     / Tbili 0.6 mg/dL/ Dbili x     / Indbili x        PT/INR/PTT - ( 17 Mar 2024 15:58 )   PT: 17.30 sec; INR: 1.51 ratio; PTT 40.2 sec    Lactate, Blood: 1.0 mmol/L (03-17 @ 15:58)      CXR reviewed and interpreted by me - no hemothorax/pneumothorax  CTH/Csp reviewed and interpreted by me - no acute traumatic injuries, L thyroid nodule  CT C/A/P reviewed and interpreted by me - no acute traumatic injuries, age-indeterminate T4 compression deformity, healing R posterior 9th rib fx with callus, R middle lobe opacity    Assessment/Plan:  78y Female PMH dementia, afib on eliquis, HTN s/p unwitnessed fall at nursing home. Coagulopathy due to eliquis on lab work. CT scan with no acute injuries. No tenderness on back exam or suggest T4 compression deformity is acute. L thyroid nodule will require outpatient thyroid ultrasound. R middle lobe opacity will require outpatient CT scan in 3-6 months. Disposition per ED.    Chu Pink MD  Trauma/Acute Care Surgery/Surgical Critical Care Attending

## 2024-03-17 NOTE — ED ADULT NURSE NOTE - CHIEF COMPLAINT QUOTE
keron ems from [facility]. tripped and fell hit the back of her head on Eliquis. as per em pt at baseline mental status

## 2024-03-18 LAB
ALBUMIN SERPL ELPH-MCNC: 4.1 G/DL — SIGNIFICANT CHANGE UP (ref 3.5–5.2)
ALP SERPL-CCNC: 104 U/L — SIGNIFICANT CHANGE UP (ref 30–115)
ALT FLD-CCNC: 12 U/L — SIGNIFICANT CHANGE UP (ref 0–41)
ANION GAP SERPL CALC-SCNC: 11 MMOL/L — SIGNIFICANT CHANGE UP (ref 7–14)
AST SERPL-CCNC: 20 U/L — SIGNIFICANT CHANGE UP (ref 0–41)
BILIRUB SERPL-MCNC: 0.8 MG/DL — SIGNIFICANT CHANGE UP (ref 0.2–1.2)
BUN SERPL-MCNC: 15 MG/DL — SIGNIFICANT CHANGE UP (ref 10–20)
CALCIUM SERPL-MCNC: 9.9 MG/DL — SIGNIFICANT CHANGE UP (ref 8.4–10.5)
CHLORIDE SERPL-SCNC: 104 MMOL/L — SIGNIFICANT CHANGE UP (ref 98–110)
CO2 SERPL-SCNC: 24 MMOL/L — SIGNIFICANT CHANGE UP (ref 17–32)
CREAT SERPL-MCNC: 1 MG/DL — SIGNIFICANT CHANGE UP (ref 0.7–1.5)
EGFR: 58 ML/MIN/1.73M2 — LOW
GLUCOSE SERPL-MCNC: 79 MG/DL — SIGNIFICANT CHANGE UP (ref 70–99)
HCT VFR BLD CALC: 33.7 % — LOW (ref 37–47)
HCV AB S/CO SERPL IA: 0.04 COI — SIGNIFICANT CHANGE UP
HCV AB SERPL-IMP: SIGNIFICANT CHANGE UP
HGB BLD-MCNC: 11.1 G/DL — LOW (ref 12–16)
MAGNESIUM SERPL-MCNC: 1.9 MG/DL — SIGNIFICANT CHANGE UP (ref 1.8–2.4)
MCHC RBC-ENTMCNC: 30.8 PG — SIGNIFICANT CHANGE UP (ref 27–31)
MCHC RBC-ENTMCNC: 32.9 G/DL — SIGNIFICANT CHANGE UP (ref 32–37)
MCV RBC AUTO: 93.6 FL — SIGNIFICANT CHANGE UP (ref 81–99)
NRBC # BLD: 0 /100 WBCS — SIGNIFICANT CHANGE UP (ref 0–0)
PLATELET # BLD AUTO: 163 K/UL — SIGNIFICANT CHANGE UP (ref 130–400)
PMV BLD: 11.4 FL — HIGH (ref 7.4–10.4)
POTASSIUM SERPL-MCNC: 3.7 MMOL/L — SIGNIFICANT CHANGE UP (ref 3.5–5)
POTASSIUM SERPL-SCNC: 3.7 MMOL/L — SIGNIFICANT CHANGE UP (ref 3.5–5)
PROT SERPL-MCNC: 7.3 G/DL — SIGNIFICANT CHANGE UP (ref 6–8)
RBC # BLD: 3.6 M/UL — LOW (ref 4.2–5.4)
RBC # FLD: 13.5 % — SIGNIFICANT CHANGE UP (ref 11.5–14.5)
SODIUM SERPL-SCNC: 139 MMOL/L — SIGNIFICANT CHANGE UP (ref 135–146)
TSH SERPL-MCNC: 1.24 UIU/ML — SIGNIFICANT CHANGE UP (ref 0.27–4.2)
WBC # BLD: 7.59 K/UL — SIGNIFICANT CHANGE UP (ref 4.8–10.8)
WBC # FLD AUTO: 7.59 K/UL — SIGNIFICANT CHANGE UP (ref 4.8–10.8)

## 2024-03-18 PROCEDURE — 95816 EEG AWAKE AND DROWSY: CPT | Mod: 26

## 2024-03-18 PROCEDURE — 99222 1ST HOSP IP/OBS MODERATE 55: CPT

## 2024-03-18 RX ORDER — ALENDRONATE SODIUM 70 MG/1
1 TABLET ORAL
Refills: 0 | DISCHARGE

## 2024-03-18 RX ORDER — APIXABAN 2.5 MG/1
5 TABLET, FILM COATED ORAL
Refills: 0 | Status: DISCONTINUED | OUTPATIENT
Start: 2024-03-18 | End: 2024-03-21

## 2024-03-18 RX ORDER — SERTRALINE 25 MG/1
100 TABLET, FILM COATED ORAL DAILY
Refills: 0 | Status: DISCONTINUED | OUTPATIENT
Start: 2024-03-18 | End: 2024-03-21

## 2024-03-18 RX ORDER — AMLODIPINE BESYLATE 2.5 MG/1
5 TABLET ORAL DAILY
Refills: 0 | Status: DISCONTINUED | OUTPATIENT
Start: 2024-03-18 | End: 2024-03-21

## 2024-03-18 RX ORDER — ACETAMINOPHEN 500 MG
650 TABLET ORAL EVERY 6 HOURS
Refills: 0 | Status: DISCONTINUED | OUTPATIENT
Start: 2024-03-18 | End: 2024-03-21

## 2024-03-18 RX ORDER — MEMANTINE HYDROCHLORIDE 10 MG/1
10 TABLET ORAL
Refills: 0 | Status: DISCONTINUED | OUTPATIENT
Start: 2024-03-18 | End: 2024-03-21

## 2024-03-18 RX ORDER — LOSARTAN POTASSIUM 100 MG/1
50 TABLET, FILM COATED ORAL DAILY
Refills: 0 | Status: DISCONTINUED | OUTPATIENT
Start: 2024-03-18 | End: 2024-03-19

## 2024-03-18 RX ORDER — LEFLUNOMIDE 10 MG/1
2 TABLET ORAL
Refills: 0 | DISCHARGE

## 2024-03-18 RX ORDER — MEGESTROL ACETATE 40 MG/ML
20 SUSPENSION ORAL DAILY
Refills: 0 | Status: DISCONTINUED | OUTPATIENT
Start: 2024-03-18 | End: 2024-03-21

## 2024-03-18 RX ORDER — ATORVASTATIN CALCIUM 80 MG/1
40 TABLET, FILM COATED ORAL AT BEDTIME
Refills: 0 | Status: DISCONTINUED | OUTPATIENT
Start: 2024-03-18 | End: 2024-03-21

## 2024-03-18 RX ORDER — POLYETHYLENE GLYCOL 3350 17 G/17G
17 POWDER, FOR SOLUTION ORAL DAILY
Refills: 0 | Status: DISCONTINUED | OUTPATIENT
Start: 2024-03-18 | End: 2024-03-21

## 2024-03-18 RX ORDER — METOPROLOL TARTRATE 50 MG
50 TABLET ORAL DAILY
Refills: 0 | Status: DISCONTINUED | OUTPATIENT
Start: 2024-03-18 | End: 2024-03-21

## 2024-03-18 RX ORDER — CALCIUM CARBONATE 500(1250)
1 TABLET ORAL
Refills: 0 | DISCHARGE

## 2024-03-18 RX ADMIN — APIXABAN 5 MILLIGRAM(S): 2.5 TABLET, FILM COATED ORAL at 05:46

## 2024-03-18 RX ADMIN — MEMANTINE HYDROCHLORIDE 10 MILLIGRAM(S): 10 TABLET ORAL at 05:47

## 2024-03-18 RX ADMIN — ATORVASTATIN CALCIUM 40 MILLIGRAM(S): 80 TABLET, FILM COATED ORAL at 21:14

## 2024-03-18 RX ADMIN — LOSARTAN POTASSIUM 50 MILLIGRAM(S): 100 TABLET, FILM COATED ORAL at 05:46

## 2024-03-18 RX ADMIN — MEMANTINE HYDROCHLORIDE 10 MILLIGRAM(S): 10 TABLET ORAL at 17:13

## 2024-03-18 RX ADMIN — APIXABAN 5 MILLIGRAM(S): 2.5 TABLET, FILM COATED ORAL at 17:13

## 2024-03-18 RX ADMIN — Medication 50 MILLIGRAM(S): at 05:47

## 2024-03-18 RX ADMIN — AMLODIPINE BESYLATE 5 MILLIGRAM(S): 2.5 TABLET ORAL at 05:47

## 2024-03-18 NOTE — CHART NOTE - NSCHARTNOTEFT_GEN_A_CORE
INTERVAL HPI/OVERNIGHT EVENTS:    SUBJECTIVE: Patient seen and examined at bedside.     unable to obtain ros    OBJECTIVE:    VITAL SIGNS:  Vital Signs Last 24 Hrs  T(C): 36.8 (18 Mar 2024 07:49), Max: 37.1 (17 Mar 2024 15:24)  T(F): 98.2 (18 Mar 2024 07:49), Max: 98.8 (17 Mar 2024 15:24)  HR: 68 (18 Mar 2024 07:49) (64 - 89)  BP: 135/70 (18 Mar 2024 07:49) (131/79 - 161/96)  BP(mean): --  RR: 18 (18 Mar 2024 07:49) (16 - 18)  SpO2: 95% (18 Mar 2024 07:49) (95% - 98%)    Parameters below as of 18 Mar 2024 07:49  Patient On (Oxygen Delivery Method): room air          PHYSICAL EXAM:    General: NAD  HEENT: NC/AT; PERRL, clear conjunctiva  Neck: supple  Respiratory: CTA b/l  Cardiovascular: +S1/S2; RRR  Abdomen: soft, NT/ND; +BS x4  Extremities: WWP, 2+ peripheral pulses b/l; no LE edema  Skin: normal color and turgor; no rash  Neurological:    MEDICATIONS:  MEDICATIONS  (STANDING):  amLODIPine   Tablet 5 milliGRAM(s) Oral daily  apixaban 5 milliGRAM(s) Oral two times a day  atorvastatin 40 milliGRAM(s) Oral at bedtime  losartan 50 milliGRAM(s) Oral daily  megestrol 20 milliGRAM(s) Oral daily  memantine 10 milliGRAM(s) Oral two times a day  metoprolol succinate ER 50 milliGRAM(s) Oral daily  polyethylene glycol 3350 17 Gram(s) Oral daily  sertraline 100 milliGRAM(s) Oral daily    MEDICATIONS  (PRN):  acetaminophen     Tablet .. 650 milliGRAM(s) Oral every 6 hours PRN Moderate Pain (4 - 6)      ALLERGIES:  Allergies    Decadron (Unknown)    Intolerances        LABS:                        11.1   7.59  )-----------( 163      ( 18 Mar 2024 07:06 )             33.7     Hemoglobin: 11.1 g/dL (03-18 @ 07:06)  Hemoglobin: 11.1 g/dL (03-17 @ 15:58)    CBC Full  -  ( 18 Mar 2024 07:06 )  WBC Count : 7.59 K/uL  RBC Count : 3.60 M/uL  Hemoglobin : 11.1 g/dL  Hematocrit : 33.7 %  Platelet Count - Automated : 163 K/uL  Mean Cell Volume : 93.6 fL  Mean Cell Hemoglobin : 30.8 pg  Mean Cell Hemoglobin Concentration : 32.9 g/dL  Auto Neutrophil # : x  Auto Lymphocyte # : x  Auto Monocyte # : x  Auto Eosinophil # : x  Auto Basophil # : x  Auto Neutrophil % : x  Auto Lymphocyte % : x  Auto Monocyte % : x  Auto Eosinophil % : x  Auto Basophil % : x    03-18    139  |  104  |  15  ----------------------------<  79  3.7   |  24  |  1.0    Ca    9.9      18 Mar 2024 07:06  Mg     1.9     03-18    TPro  7.3  /  Alb  4.1  /  TBili  0.8  /  DBili  x   /  AST  20  /  ALT  12  /  AlkPhos  104  03-18    Creatinine Trend: 1.0<--, 1.1<--  LIVER FUNCTIONS - ( 18 Mar 2024 07:06 )  Alb: 4.1 g/dL / Pro: 7.3 g/dL / ALK PHOS: 104 U/L / ALT: 12 U/L / AST: 20 U/L / GGT: x           PT/INR - ( 17 Mar 2024 15:58 )   PT: 17.30 sec;   INR: 1.51 ratio         PTT - ( 17 Mar 2024 15:58 )  PTT:40.2 sec    hs Troponin:                9 <<== 03-17-24 @ 18:53                10 <<== 03-17-24 @ 15:58            Urinalysis Basic - ( 18 Mar 2024 07:06 )    Color: x / Appearance: x / SG: x / pH: x  Gluc: 79 mg/dL / Ketone: x  / Bili: x / Urobili: x   Blood: x / Protein: x / Nitrite: x   Leuk Esterase: x / RBC: x / WBC x   Sq Epi: x / Non Sq Epi: x / Bacteria: x      CSF:                      EKG:   MICROBIOLOGY:    IMAGING:      Labs, imaging, EKG personally reviewed    RADIOLOGY & ADDITIONAL TESTS: Reviewed.    a/p  #Unwitnessed fall  cth c spine neg  ct chest abd with t4 compression deformity; healing R rib fx  orthostatics  reeg  monitor on tele  ua mild; f/u ucx; monitor off abx  outpt neurosx eval  PT    #Thyroid nodule  incidentally noted on ct  outpt f/u with us    #Pleural nodule  incidentally noted on ct  outpt f/u with ct 3-6 mos    #Progress Note Handoff  Pending (specify): orthostatics, ucx, reeg  Family discussion:   Disposition: snf

## 2024-03-18 NOTE — ED ADULT NURSE REASSESSMENT NOTE - NS ED NURSE REASSESS COMMENT FT1
Patient refuses to wear monitor and continually gets out of bed, multiple attempts to call MD made Home

## 2024-03-18 NOTE — EEG REPORT - NS EEG TEXT BOX
[facility] Department of Neurology  Inpatient Routine-EEG Report      Patient Name:	AMBROSIO DOWNING    :	1945  MRN:	-  Study Date/Time:	3/18/2024, 11:54:02 AM  Referred by:	-    Brief Clinical History:  AMBROSIO DOWNING is a 78 year old Female; study performed to investigate for seizures or markers of epilepsy.   Diagnosis Code: R40.4 Transient alteration of awareness    Patient Medication:  Zoloft    Toprol    Namenda    Megace    Cozaar    Lipitor    Norvasc    Tylenol    Eliquis      Acquisition Details:  Electroencephalography was acquired using a minimum of 21 channels on an [employer] system v 9.3.1 with electrode placement according to the standard International 10-20 system following ACNS (American Clinical Neurophysiology Society) guidelines.  Anterior temporal T1 and T2 electrodes were utilized whenever possible.   The [employer] automated spike & seizure detections were all reviewed in detail, in addition to the entire raw EEG.    Findings:  Background:  continuous.   Voltage:  Normal (20uV)  Organization:  Appropriate anterior-posterior gradient  Posterior Dominant Rhythm:  7-8 Hz symmetric, well-organized, and well-modulated  Variability:  Yes	Reactivity:  Yes  Sleep:  Absent.  Focal abnormalities:  No persistent asymmetries of voltage or frequency.  Interictal Activity:  None  Focal Slowing:  None  Generalized Slowing:  No  Events:  1)	No electrographic seizures or significant clinical events.  Provocations:  1)	Hyperventilation: was not performed.  2)	Photic stimulation: was not performed.  Impression:  Normal REEG    Clinical Correlation:  Normal study does not exclude diagnosis of seizure disorder    Angela Mirza MD  Attending Neurologist, Division of Epilepsy

## 2024-03-18 NOTE — PHYSICAL THERAPY INITIAL EVALUATION ADULT - ADDITIONAL COMMENTS
Per chart, pt resides in [facility], fall prompting this admission. Pt signaled towards RW when talking about walking however did not want to use upon mobilizing.

## 2024-03-18 NOTE — PHYSICAL THERAPY INITIAL EVALUATION ADULT - GENERAL OBSERVATIONS, REHAB EVAL
[phone] Pt received and left sitting in bedside chair, NAD, pt not initially agreeable to PT session, pt educated on importance of OOB mobility, POC and role of PT, initiated mobility at the end of the session with RN staff present, PCA assist for Citizen of [place] translation/interpretation

## 2024-03-18 NOTE — H&P ADULT - NSHPLABSRESULTS_GEN_ALL_CORE
.  LABS:                         11.1   9.25  )-----------( 170      ( 17 Mar 2024 15:58 )             34.8         141  |  106  |  19  ----------------------------<  89  4.0   |  25  |  1.1    Ca    10.5      17 Mar 2024 15:58    TPro  7.6  /  Alb  4.2  /  TBili  0.6  /  DBili  x   /  AST  23  /  ALT  13  /  AlkPhos  118<H>      PT/INR - ( 17 Mar 2024 15:58 )   PT: 17.30 sec;   INR: 1.51 ratio         PTT - ( 17 Mar 2024 15:58 )  PTT:40.2 sec  Urinalysis Basic - ( 17 Mar 2024 17:18 )    Color: Yellow / Appearance: Clear / S.008 / pH: x  Gluc: x / Ketone: Negative mg/dL  / Bili: Negative / Urobili: 0.2 mg/dL   Blood: x / Protein: Negative mg/dL / Nitrite: Negative   Leuk Esterase: Small / RBC: 1 /HPF / WBC 11 /HPF   Sq Epi: x / Non Sq Epi: 1 /HPF / Bacteria: Negative /HPF            Lactate, Blood: 1.0 mmol/L ( @ 15:58)      RADIOLOGY, EKG & ADDITIONAL TESTS: Reviewed.

## 2024-03-18 NOTE — PHYSICAL THERAPY INITIAL EVALUATION ADULT - PERTINENT HX OF CURRENT PROBLEM, REHAB EVAL
78y PMHx of dementia, AFib on eliquis, RA, HTN, HLD seen as a trauma alert s/p mechanical fall at [facility]. Patient states she was trying to reach of an object when the next thing she remembers is waking up on the floor. Nursing staff heard a thud and found patient awake and alert on the floor. Patient A&Ox2 complaining of left sided abd pain and leg pain. Patient admits to head strike, on AC for Afib. No sign of bleeding, otherwise denies any fever, chills, headache, changes in vision, cough, congestion, cp, palpitations, sob, n/v/d, constipation, urinary complaints.

## 2024-03-18 NOTE — PHYSICAL THERAPY INITIAL EVALUATION ADULT - BED MOBILITY TRAINING, PT EVAL
Pt will perform supine <> sit independently by discharge Doxycycline Pregnancy And Lactation Text: This medication is Pregnancy Category D and not consider safe during pregnancy. It is also excreted in breast milk but is considered safe for shorter treatment courses.

## 2024-03-18 NOTE — H&P ADULT - HISTORY OF PRESENT ILLNESS
Patient is a 78y PMHx of dementia, AFib on eliquis, RA, HTN, HLD seen as a trauma alert s/p mechanical fall at [facility]. Patient states she was trying to reach of an object when the next thing she remembers is waking up on the floor. Nursing staff heard a thud and found patient awake and alert on the floor. Patient A&Ox2 complaining of left sided abd pain and leg pain. Patient admits to head strike, on AC for Afib. No sign of bleeding, otherwise denies any fever, chills, headache, changes in vision, cough, congestion, cp, palpitations, sob, n/v/d, constipation, urinary complaints.  In the ED, patient HD stable, afebrile     T(C): 36.9 (17 Mar 2024 20:40), Max: 37.1 (17 Mar 2024 15:24)  T(F): 98.4 (17 Mar 2024 20:40), Max: 98.8 (17 Mar 2024 15:24)  HR: 72 (18 Mar 2024 00:05) (64 - 89)  BP: 146/97 (18 Mar 2024 00:05) (131/79 - 161/96)  RR: 18 (18 Mar 2024 00:05) (16 - 18)  SpO2: 96% (18 Mar 2024 00:05) (95% - 98%)    CXR no hemothorax/pneumothorax  CTH no acute traumatic injuries, L thyroid nodule  CT C/A/P age-indeterminate T4 compression deformity, healing R posterior 9th rib fx with callus, R middle lobe opacity

## 2024-03-18 NOTE — H&P ADULT - ASSESSMENT
Patient is a 78y PMHx of dementia, AFib on eliquis, RA, HTN, HLD seen as a trauma alert s/p mechanical fall at [facility]    #Fall likely mechanical   - HD stable, afebrile   - Unsure LOC, on AC, +Head strike   - Trauma workup negative, seen by trauma team   - Electrolytes, glucose wnl   - ECG: Afib rate controlled, Trop negative   - r/o seizure, f/u rEEG, f/u orthostatic vitals   - Admit to tele   - pain control PRN   - PT/OT consult rpt CTH if any change in mental status     #HO AFib   - Rate controlled  - c/w eliquis, metoprolol     #HTN   #HLD  - c/w statin, losartan, amlodipine  - Hold BP meds if hypotensive     #HO dementia   - c/w memantine     DVT: Eliquis   Diet: DASH   Activity: IAT        Patient is a 78y PMHx of dementia, AFib on eliquis, RA, HTN, HLD seen as a trauma alert s/p mechanical fall at [facility]    #Fall likely mechanical   - HD stable, afebrile   - Unsure LOC, on AC, +Head strike   - Trauma workup negative, seen by trauma team   - Electrolytes, glucose wnl   - ECG: Afib rate controlled, Trop negative   - r/o seizure, f/u rEEG, f/u orthostatic vitals   - Admit to tele   - pain control PRN   - PT/OT consult rpt CTH if any change in mental status     #HO AFib   - Rate controlled  - c/w eliquis, metoprolol     #Left thyroid nodule  - Incidental finding: nodule with  calcification. 1.8 x 0.9 cm prevascular mediastinal nodule (3/44).  - F/u TSH, OP follow up     #Pleural opacity   - Incidental finding: 1.9 x 1.4 x 5.5 cm pleural-based opacity, medial right lower lobe is present (series 3/58, 9/231).  - Follow-up CT scan in 3-6 months time recommended.    #HTN   #HLD  - c/w statin, losartan, amlodipine  - Hold BP meds if hypotensive     #HO dementia   - c/w memantine     DVT: Eliquis   Diet: DASH   Activity: IAT

## 2024-03-18 NOTE — CHART NOTE - NSCHARTNOTEFT_GEN_A_CORE
Tertiary Trauma Survey (TTS)    Date of TTS: 03-18-24 @ 17:03   Admit Date: 03-17-24  Trauma Activation: CODE / ALERT / CONSULT  Admit GCS:        E-     V-     M-     HPI:  Patient is a 78y PMHx of dementia, AFib on eliquis, RA, HTN, HLD seen as a trauma alert s/p mechanical fall at [facility]. Patient states she was trying to reach of an object when the next thing she remembers is waking up on the floor. Nursing staff heard a thud and found patient awake and alert on the floor. Patient A&Ox2 complaining of left sided abd pain and leg pain. Patient admits to head strike, on AC for Afib. No sign of bleeding, otherwise denies any fever, chills, headache, changes in vision, cough, congestion, cp, palpitations, sob, n/v/d, constipation, urinary complaints.  In the ED, patient HD stable, afebrile     Patient seen and examined at bedside. Does not endorse and new pain. No ecchymoses, hematoma noted. Patient w/ strength and neuro intact.     T(C): 36.9 (17 Mar 2024 20:40), Max: 37.1 (17 Mar 2024 15:24)  T(F): 98.4 (17 Mar 2024 20:40), Max: 98.8 (17 Mar 2024 15:24)  HR: 72 (18 Mar 2024 00:05) (64 - 89)  BP: 146/97 (18 Mar 2024 00:05) (131/79 - 161/96)  RR: 18 (18 Mar 2024 00:05) (16 - 18)  SpO2: 96% (18 Mar 2024 00:05) (95% - 98%)    CXR no hemothorax/pneumothorax  CTH no acute traumatic injuries, L thyroid nodule  CT C/A/P age-indeterminate T4 compression deformity, healing R posterior 9th rib fx with callus, R middle lobe opacity     (18 Mar 2024 00:20)    Patient seen and examined.     PHYSICAL EXAM:  General: NAD  HEENT: Normocephalic, atraumatic, EOMI, PEERLA. no scalp lacerations   Neck: Soft, midline trachea. no c-spine tenderness  Chest: No chest wall tenderness, no subcutaneous emphysema   Cardiac: RRR  Respiratory: Bilateral breath sounds, clear and equal bilaterally  Abdomen: Soft, non-distended, non-tender, no rebound, no guarding.  Groin: Normal appearing, pelvis stable   Ext:  Moving b/l upper and lower extremities   Back: No T/L/S spine tenderness, No palpable runoff/stepoff/deformity       Vital Signs Last 24 Hrs  T(C): 36.4 (18 Mar 2024 16:09), Max: 36.9 (17 Mar 2024 20:40)  T(F): 97.5 (18 Mar 2024 16:09), Max: 98.4 (17 Mar 2024 20:40)  HR: 80 (18 Mar 2024 16:09) (68 - 80)  BP: 125/81 (18 Mar 2024 16:09) (125/81 - 161/96)  BP(mean): --  RR: 18 (18 Mar 2024 16:09) (18 - 18)  SpO2: 95% (18 Mar 2024 07:49) (95% - 96%)    Parameters below as of 18 Mar 2024 07:49  Patient On (Oxygen Delivery Method): room air        Labs:  CAPILLARY BLOOD GLUCOSE                              11.1   7.59  )-----------( 163      ( 18 Mar 2024 07:06 )             33.7         03-18    139  |  104  |  15  ----------------------------<  79  3.7   |  24  |  1.0      Calcium: 9.9 mg/dL (03-18-24 @ 07:06)      LFTs:             7.3  | 0.8  | 20       ------------------[104     ( 18 Mar 2024 07:06 )  4.1  | x    | 12          Lipase:x      Amylase:x         Lactate, Blood: 1.0 mmol/L (03-17-24 @ 15:58)      Coags:     17.30  ----< 1.51    ( 17 Mar 2024 15:58 )     40.2                Urinalysis Basic - ( 18 Mar 2024 07:06 )    Color: x / Appearance: x / SG: x / pH: x  Gluc: 79 mg/dL / Ketone: x  / Bili: x / Urobili: x   Blood: x / Protein: x / Nitrite: x   Leuk Esterase: x / RBC: x / WBC x   Sq Epi: x / Non Sq Epi: x / Bacteria: x       PANSCAN NEGATIVE    ASSESSMENT/ PLAN:   78y PMHx of dementia, AFib on eliquis, RA, HTN, HLD seen as a trauma alert s/p mechanical fall at [facility].  Trauma assessment in ED: ABCs intact , GCS 15 , AAOx3 , with physical exam findings, imaging, and labs as documented above, injuries are identified:       - All images/reports reviewed. No further traumatic work-up warranted.

## 2024-03-18 NOTE — H&P ADULT - ATTENDING COMMENTS
*In the event of discrepancy, my note supersedes the resident note.  Date seen: 3/18/24   Agree with HPI above. ROS negative except per HPI.   I reviewed and edited the physical exam above.   Pertinent labs and radiology reviewed and as above.    Assessment/Plan:  Patient is a 78y PMHx of dementia, AFib on eliquis, RA, HTN, HLD seen as a trauma alert s/p mechanical fall at [facility]. Admitted for further work up.     #Unwitnessed fall  * DDx: suspect mechanical in nature  - trauma w/u negative   - ECG: Afib rate controlled, Trop negative   - get PT eval and orthostatics      #Left thyroid nodule  - Incidental finding: nodule with  calcification. 1.8 x 0.9 cm prevascular mediastinal nodule (3/44).  - F/u TSH, OP follow up     #Pleural opacity   - Incidental finding: 1.9 x 1.4 x 5.5 cm pleural-based opacity, medial right lower lobe is present (series 3/58, 9/231).  - Follow-up CT scan in 3-6 months time recommended.    #Dementia   #HTN  #HLD  #chronic afib   - c/w home meds     DVT ppx: eliquis  Dispo: from NH; orthostatics, PT eval

## 2024-03-18 NOTE — ED ADULT NURSE REASSESSMENT NOTE - NS ED NURSE REASSESS COMMENT FT1
Patient has refused cardiac monitoring and primafit after multiple attempts. Patient has ripped off leads every attempt to keep them on

## 2024-03-19 ENCOUNTER — TRANSCRIPTION ENCOUNTER (OUTPATIENT)
Age: 79
End: 2024-03-19

## 2024-03-19 LAB
ALBUMIN SERPL ELPH-MCNC: 3.9 G/DL — SIGNIFICANT CHANGE UP (ref 3.5–5.2)
ALP SERPL-CCNC: 102 U/L — SIGNIFICANT CHANGE UP (ref 30–115)
ALT FLD-CCNC: 12 U/L — SIGNIFICANT CHANGE UP (ref 0–41)
ANION GAP SERPL CALC-SCNC: 11 MMOL/L — SIGNIFICANT CHANGE UP (ref 7–14)
AST SERPL-CCNC: 21 U/L — SIGNIFICANT CHANGE UP (ref 0–41)
BASOPHILS # BLD AUTO: 0.06 K/UL — SIGNIFICANT CHANGE UP (ref 0–0.2)
BASOPHILS NFR BLD AUTO: 0.9 % — SIGNIFICANT CHANGE UP (ref 0–1)
BILIRUB SERPL-MCNC: 0.6 MG/DL — SIGNIFICANT CHANGE UP (ref 0.2–1.2)
BUN SERPL-MCNC: 26 MG/DL — HIGH (ref 10–20)
CALCIUM SERPL-MCNC: 9.4 MG/DL — SIGNIFICANT CHANGE UP (ref 8.4–10.5)
CHLORIDE SERPL-SCNC: 106 MMOL/L — SIGNIFICANT CHANGE UP (ref 98–110)
CO2 SERPL-SCNC: 23 MMOL/L — SIGNIFICANT CHANGE UP (ref 17–32)
CREAT SERPL-MCNC: 1.2 MG/DL — SIGNIFICANT CHANGE UP (ref 0.7–1.5)
EGFR: 47 ML/MIN/1.73M2 — LOW
EOSINOPHIL # BLD AUTO: 0.27 K/UL — SIGNIFICANT CHANGE UP (ref 0–0.7)
EOSINOPHIL NFR BLD AUTO: 3.9 % — SIGNIFICANT CHANGE UP (ref 0–8)
GLUCOSE SERPL-MCNC: 94 MG/DL — SIGNIFICANT CHANGE UP (ref 70–99)
HCT VFR BLD CALC: 35.4 % — LOW (ref 37–47)
HGB BLD-MCNC: 11.5 G/DL — LOW (ref 12–16)
IMM GRANULOCYTES NFR BLD AUTO: 0.1 % — SIGNIFICANT CHANGE UP (ref 0.1–0.3)
LYMPHOCYTES # BLD AUTO: 1.91 K/UL — SIGNIFICANT CHANGE UP (ref 1.2–3.4)
LYMPHOCYTES # BLD AUTO: 27.6 % — SIGNIFICANT CHANGE UP (ref 20.5–51.1)
MAGNESIUM SERPL-MCNC: 1.9 MG/DL — SIGNIFICANT CHANGE UP (ref 1.8–2.4)
MCHC RBC-ENTMCNC: 30.6 PG — SIGNIFICANT CHANGE UP (ref 27–31)
MCHC RBC-ENTMCNC: 32.5 G/DL — SIGNIFICANT CHANGE UP (ref 32–37)
MCV RBC AUTO: 94.1 FL — SIGNIFICANT CHANGE UP (ref 81–99)
MONOCYTES # BLD AUTO: 0.74 K/UL — HIGH (ref 0.1–0.6)
MONOCYTES NFR BLD AUTO: 10.7 % — HIGH (ref 1.7–9.3)
NEUTROPHILS # BLD AUTO: 3.92 K/UL — SIGNIFICANT CHANGE UP (ref 1.4–6.5)
NEUTROPHILS NFR BLD AUTO: 56.8 % — SIGNIFICANT CHANGE UP (ref 42.2–75.2)
NRBC # BLD: 0 /100 WBCS — SIGNIFICANT CHANGE UP (ref 0–0)
PLATELET # BLD AUTO: 201 K/UL — SIGNIFICANT CHANGE UP (ref 130–400)
PMV BLD: 11.4 FL — HIGH (ref 7.4–10.4)
POTASSIUM SERPL-MCNC: 3.8 MMOL/L — SIGNIFICANT CHANGE UP (ref 3.5–5)
POTASSIUM SERPL-SCNC: 3.8 MMOL/L — SIGNIFICANT CHANGE UP (ref 3.5–5)
PROT SERPL-MCNC: 6.8 G/DL — SIGNIFICANT CHANGE UP (ref 6–8)
RBC # BLD: 3.76 M/UL — LOW (ref 4.2–5.4)
RBC # FLD: 13.4 % — SIGNIFICANT CHANGE UP (ref 11.5–14.5)
SARS-COV-2 RNA SPEC QL NAA+PROBE: SIGNIFICANT CHANGE UP
SODIUM SERPL-SCNC: 140 MMOL/L — SIGNIFICANT CHANGE UP (ref 135–146)
WBC # BLD: 6.91 K/UL — SIGNIFICANT CHANGE UP (ref 4.8–10.8)
WBC # FLD AUTO: 6.91 K/UL — SIGNIFICANT CHANGE UP (ref 4.8–10.8)

## 2024-03-19 PROCEDURE — 99232 SBSQ HOSP IP/OBS MODERATE 35: CPT

## 2024-03-19 RX ORDER — APIXABAN 2.5 MG/1
1 TABLET, FILM COATED ORAL
Refills: 0 | DISCHARGE

## 2024-03-19 RX ORDER — SERTRALINE 25 MG/1
1 TABLET, FILM COATED ORAL
Qty: 0 | Refills: 0 | DISCHARGE
Start: 2024-03-19

## 2024-03-19 RX ORDER — MEMANTINE HYDROCHLORIDE 10 MG/1
1 TABLET ORAL
Refills: 0 | DISCHARGE

## 2024-03-19 RX ORDER — ATORVASTATIN CALCIUM 80 MG/1
1 TABLET, FILM COATED ORAL
Qty: 0 | Refills: 0 | DISCHARGE
Start: 2024-03-19

## 2024-03-19 RX ORDER — METOPROLOL TARTRATE 50 MG
1 TABLET ORAL
Qty: 0 | Refills: 0 | DISCHARGE
Start: 2024-03-19

## 2024-03-19 RX ORDER — ATORVASTATIN CALCIUM 80 MG/1
1 TABLET, FILM COATED ORAL
Refills: 0 | DISCHARGE

## 2024-03-19 RX ORDER — POLYETHYLENE GLYCOL 3350 17 G/17G
17 POWDER, FOR SOLUTION ORAL
Qty: 0 | Refills: 0 | DISCHARGE
Start: 2024-03-19

## 2024-03-19 RX ORDER — AMLODIPINE BESYLATE 2.5 MG/1
1 TABLET ORAL
Qty: 0 | Refills: 0 | DISCHARGE
Start: 2024-03-19

## 2024-03-19 RX ORDER — MEGESTROL ACETATE 40 MG/ML
1 SUSPENSION ORAL
Refills: 0 | DISCHARGE

## 2024-03-19 RX ORDER — APIXABAN 2.5 MG/1
1 TABLET, FILM COATED ORAL
Qty: 0 | Refills: 0 | DISCHARGE
Start: 2024-03-19

## 2024-03-19 RX ORDER — MEMANTINE HYDROCHLORIDE 10 MG/1
1 TABLET ORAL
Qty: 0 | Refills: 0 | DISCHARGE
Start: 2024-03-19

## 2024-03-19 RX ORDER — ACETAMINOPHEN 500 MG
2 TABLET ORAL
Qty: 0 | Refills: 0 | DISCHARGE
Start: 2024-03-19

## 2024-03-19 RX ORDER — MEGESTROL ACETATE 40 MG/ML
1 SUSPENSION ORAL
Qty: 0 | Refills: 0 | DISCHARGE
Start: 2024-03-19

## 2024-03-19 RX ORDER — METOPROLOL TARTRATE 50 MG
1 TABLET ORAL
Refills: 0 | DISCHARGE

## 2024-03-19 RX ORDER — LOSARTAN POTASSIUM 100 MG/1
1 TABLET, FILM COATED ORAL
Qty: 0 | Refills: 0 | DISCHARGE
Start: 2024-03-19

## 2024-03-19 RX ORDER — SERTRALINE 25 MG/1
1 TABLET, FILM COATED ORAL
Refills: 0 | DISCHARGE

## 2024-03-19 RX ORDER — LOSARTAN POTASSIUM 100 MG/1
1 TABLET, FILM COATED ORAL
Refills: 0 | DISCHARGE

## 2024-03-19 RX ORDER — AMLODIPINE BESYLATE 2.5 MG/1
1 TABLET ORAL
Refills: 0 | DISCHARGE

## 2024-03-19 RX ADMIN — MEMANTINE HYDROCHLORIDE 10 MILLIGRAM(S): 10 TABLET ORAL at 18:52

## 2024-03-19 RX ADMIN — APIXABAN 5 MILLIGRAM(S): 2.5 TABLET, FILM COATED ORAL at 17:24

## 2024-03-19 RX ADMIN — POLYETHYLENE GLYCOL 3350 17 GRAM(S): 17 POWDER, FOR SOLUTION ORAL at 12:51

## 2024-03-19 RX ADMIN — MEMANTINE HYDROCHLORIDE 10 MILLIGRAM(S): 10 TABLET ORAL at 06:22

## 2024-03-19 RX ADMIN — ATORVASTATIN CALCIUM 40 MILLIGRAM(S): 80 TABLET, FILM COATED ORAL at 22:00

## 2024-03-19 RX ADMIN — Medication 50 MILLIGRAM(S): at 06:23

## 2024-03-19 RX ADMIN — APIXABAN 5 MILLIGRAM(S): 2.5 TABLET, FILM COATED ORAL at 06:23

## 2024-03-19 RX ADMIN — MEGESTROL ACETATE 20 MILLIGRAM(S): 40 SUSPENSION ORAL at 13:32

## 2024-03-19 RX ADMIN — AMLODIPINE BESYLATE 5 MILLIGRAM(S): 2.5 TABLET ORAL at 06:22

## 2024-03-19 RX ADMIN — SERTRALINE 100 MILLIGRAM(S): 25 TABLET, FILM COATED ORAL at 12:51

## 2024-03-19 RX ADMIN — LOSARTAN POTASSIUM 50 MILLIGRAM(S): 100 TABLET, FILM COATED ORAL at 06:22

## 2024-03-19 NOTE — DISCHARGE NOTE PROVIDER - NSDCCPCAREPLAN_GEN_ALL_CORE_FT
PRINCIPAL DISCHARGE DIAGNOSIS  Diagnosis: Syncope  Assessment and Plan of Treatment: Fall prevention includes ways to make your home and other areas safer. It also includes ways you can move more carefully to prevent a fall. Health conditions that cause changes in your blood pressure, vision, or muscle strength and coordination may increase your risk for falls. Medicines may also increase your risk for falls if they make you dizzy, weak, or sleepy.  It is likely that your fall was caused by low blood pressure when you stand up. We have optimizez your condition without overmedicated you in the hope to prevent falls.   You were found to have a left thyroid nodule with calcification. 1.8 x 0.9 cm prevascular mediastinal nodule, follow up with your doctor as outpatient. You were found to have a 1.9 x 1.4 x 5.5 cm pleural-based opacity, medial right lower lobe, Follow-up CT scan in 3-6 months time recommended.  Seek Medical Attention If:  You have fallen and are unconscious.  fallen and cannot move part of your body.  You have fallen and have pain or a headache.  Fall prevention tips:  Stand or sit up slowly.  Use assistive devices as directed.   You may need to have grab bars put in your bathroom near the toilet or in the shower.  Wear shoes that fit well and have soles that . Wear shoes both inside and outside. Do not wear shoes with high heels.  Wear a personal alarm that can call 911 in an emergency.   Manage your medical conditions. Keep all appointments with your healthcare providers. Visit your eye doctor as directed.  Home safety tips:   Put nonslip strips on your bath or shower floor.  Use a shower seat. Sit on the toilet or a chair in your bathroom.  Keep paths clear. Remove books, shoes, and other objects from walkways and stairs. Place cords for telephones and lamps out of the way. Remove small rugs or secure it with double-sided tape.  Install bright lights in your home. Use night lights to help light paths to the bathroom or kitchen. Always turn on the light before you start walking.  Keep items you use often on shelves within reach. Do not use a step s     PRINCIPAL DISCHARGE DIAGNOSIS  Diagnosis: Syncope  Assessment and Plan of Treatment: Fall prevention includes ways to make your home and other areas safer. It also includes ways you can move more carefully to prevent a fall. Health conditions that cause changes in your blood pressure, vision, or muscle strength and coordination may increase your risk for falls. Medicines may also increase your risk for falls if they make you dizzy, weak, or sleepy.  It is likely that your fall was caused by low blood pressure when you stand up. We have optimizez your condition without overmedicated you in the hope to prevent falls.   You were found to have a left thyroid nodule with calcification. 1.8 x 0.9 cm prevascular mediastinal nodule, follow up with your doctor as outpatient. You were found to have a 1.9 x 1.4 x 5.5 cm pleural-based opacity, medial right lower lobe, Follow-up CT scan in 3-6 months time recommended.  Seek Medical Attention If:  You have fallen and are unconscious.  fallen and cannot move part of your body.  You have fallen and have pain or a headache.  Fall prevention tips:  Stand or sit up slowly.  Use assistive devices as directed.   You may need to have grab bars put in your bathroom near the toilet or in the shower.  Wear shoes that fit well and have soles that . Wear shoes both inside and outside. Do not wear shoes with high heels.  Wear a personal alarm that can call 911 in an emergency.   Manage your medical conditions. Keep all appointments with your healthcare providers. Visit your eye doctor as directed.  Home safety tips:   Put nonslip strips on your bath or shower floor.  Use a shower seat. Sit on the toilet or a chair in your bathroom.  Keep paths clear. Remove books, shoes, and other objects from walkways and stairs. Place cords for telephones and lamps out of the way. Remove small rugs or secure it with double-sided tape.  Install bright lights in your home. Use night lights to help light paths to the bathroom or kitchen. Always turn on the light before you start walking.  Keep items you use often on shelves within reach. Do not use a step

## 2024-03-19 NOTE — DISCHARGE NOTE PROVIDER - CARE PROVIDER_API CALL
David [address] [address]  Phone: [phone]  Fax: [phone]  Follow Up Time: 2 weeks

## 2024-03-19 NOTE — DISCHARGE NOTE PROVIDER - HOSPITAL COURSE
Patient is a 78y PMHx of dementia, AFib on eliquis, RA, HTN, HLD seen as a trauma alert s/p mechanical fall at [facility]. Patient states she was trying to reach of an object when the next thing she remembers is waking up on the floor. Nursing staff heard a thud and found patient awake and alert on the floor. Patient A&Ox2 complaining of left sided abd pain and leg pain. Patient admits to head strike, on AC for Afib. No sign of bleeding, otherwise denies any fever, chills, headache, changes in vision, cough, congestion, cp, palpitations, sob, n/v/d, constipation, urinary complaints. In the ED, patient HD stable, afebrile. CXR no hemothorax/pneumothorax, CTH no acute traumatic injuries, L thyroid nodule, CT C/A/P age-indeterminate T4 compression deformity, healing R posterior 9th rib fx with callus, R middle lobe opacity. Orthostatics done were positive. ECG was done Afib rate controlled, rEEG done was normal. PT was consulted recommending OTTO. Patient hemodynamically stable for discharge.     #Fall likely mechanical  - Orthostatic positive  - Trauma workup negative, seen by trauma team   - PT/OT eval appreciated - OTTO    #Chronic AFib - Rate controlled  - c/w eliquis, metoprolol     #Left thyroid nodule  - Incidental finding: nodule with  calcification. 1.8 x 0.9 cm prevascular mediastinal nodule (3/44).  - TSH 1.24  - Follow up outpatient    #Pleural opacity   - Incidental finding: 1.9 x 1.4 x 5.5 cm pleural-based opacity, medial right lower lobe is present (series 3/58, 9/231).  - Follow-up CT scan in 3-6 months time recommended.    #HTN   #HLD  - c/w statin, losartan, amlodipine    #HO dementia   - c/w memantine      Patient is a 78y PMHx of dementia, AFib on eliquis, RA, HTN, HLD seen as a trauma alert s/p mechanical fall at [facility]. Patient states she was trying to reach of an object when the next thing she remembers is waking up on the floor. Nursing staff heard a thud and found patient awake and alert on the floor. Patient A&Ox2 complaining of left sided abd pain and leg pain. Patient admits to head strike, on AC for Afib. No sign of bleeding, otherwise denies any fever, chills, headache, changes in vision, cough, congestion, cp, palpitations, sob, n/v/d, constipation, urinary complaints. In the ED, patient HD stable, afebrile. CXR no hemothorax/pneumothorax, CTH no acute traumatic injuries, L thyroid nodule, CT C/A/P age-indeterminate T4 compression deformity, healing R posterior 9th rib fx with callus, R middle lobe opacity. Orthostatics done were positive. ECG was done Afib rate controlled, rEEG done was normal. PT was consulted recommending OTTO. Patient hemodynamically stable for discharge.     #Fall likely mechanical vs orthostatic hypotension  - Orthostatic positive  - Trauma workup negative, seen by trauma team   - PT/OT eval appreciated - OTTO  - Holding losartan, subsequent orthostatics negative    #Chronic AFib - Rate controlled  - c/w eliquis, metoprolol     #Left thyroid nodule  - Incidental finding: nodule with  calcification. 1.8 x 0.9 cm prevascular mediastinal nodule (3/44).  - TSH 1.24  - Follow up outpatient    #Pleural opacity   - Incidental finding: 1.9 x 1.4 x 5.5 cm pleural-based opacity, medial right lower lobe is present (series 3/58, 9/231).  - Follow-up CT scan in 3-6 months time recommended.    #HTN   #HLD  - c/w statin, amlodipine  - Holding losartan for orthostatics    #HO dementia   - c/w memantine      Patient is a 78y PMHx of dementia, AFib on eliquis, RA, HTN, HLD seen as a trauma alert s/p mechanical fall at [facility]. Patient states she was trying to reach of an object when the next thing she remembers is waking up on the floor. Nursing staff heard a thud and found patient awake and alert on the floor. Patient A&Ox2 complaining of left sided abd pain and leg pain. Patient admits to head strike, on AC for Afib. No sign of bleeding, otherwise denies any fever, chills, headache, changes in vision, cough, congestion, cp, palpitations, sob, n/v/d, constipation, urinary complaints. In the ED, patient HD stable, afebrile. CXR no hemothorax/pneumothorax, CTH no acute traumatic injuries, L thyroid nodule, CT C/A/P age-indeterminate T4 compression deformity, healing R posterior 9th rib fx with callus, R middle lobe opacity. Orthostatics done were positive. ECG was done Afib rate controlled, rEEG done was normal. PT was consulted recommending OTTO. Patient hemodynamically stable for discharge.     #Fall likely mechanical vs orthostatic hypotension  - Orthostatic positive  - Trauma workup negative, seen by trauma team   - PT/OT eval appreciated - OTTO  - Holding losartan, subsequent orthostatics negative  - Follow up outpatient    #Chronic AFib - Rate controlled  - c/w eliquis, metoprolol     #Left thyroid nodule  - Incidental finding: nodule with  calcification. 1.8 x 0.9 cm prevascular mediastinal nodule (3/44).  - TSH 1.24  - Follow up outpatient    #Pleural opacity   - Incidental finding: 1.9 x 1.4 x 5.5 cm pleural-based opacity, medial right lower lobe is present (series 3/58, 9/231).  - Follow-up CT scan in 3-6 months time recommended.    #HTN   #HLD  - c/w statin, amlodipine  - Holding losartan for orthostatics    #HO dementia   - c/w memantine

## 2024-03-19 NOTE — DISCHARGE NOTE PROVIDER - NSDCMRMEDTOKEN_GEN_ALL_CORE_FT
acetaminophen 325 mg oral tablet: 2 tab(s) orally every 6 hours As needed Moderate Pain (4 - 6)  alendronate 70 mg oral tablet: 1 tab(s) orally every 7 days  amLODIPine 5 mg oral tablet: 1 tab(s) orally once a day  apixaban 5 mg oral tablet: 1 tab(s) orally 2 times a day  atorvastatin 40 mg oral tablet: 1 tab(s) orally once a day (at bedtime)  calcium (as carbonate) 600 mg oral tablet: 1 tab(s) orally once a day  leflunomide 10 mg oral tablet: 2 tab(s) orally once a day  losartan 50 mg oral tablet: 1 tab(s) orally once a day  megestrol 20 mg oral tablet: 1 tab(s) orally once a day  memantine 10 mg oral tablet: 1 tab(s) orally 2 times a day  metoprolol succinate 50 mg oral tablet, extended release: 1 tab(s) orally once a day  polyethylene glycol 3350 oral powder for reconstitution: 17 gram(s) orally once a day as needed for  constipation  sertraline 100 mg oral tablet: 1 tab(s) orally once a day   acetaminophen 325 mg oral tablet: 2 tab(s) orally every 6 hours As needed Moderate Pain (4 - 6)  alendronate 70 mg oral tablet: 1 tab(s) orally every 7 days  amLODIPine 5 mg oral tablet: 1 tab(s) orally once a day  apixaban 5 mg oral tablet: 1 tab(s) orally 2 times a day  atorvastatin 40 mg oral tablet: 1 tab(s) orally once a day (at bedtime)  calcium (as carbonate) 600 mg oral tablet: 1 tab(s) orally once a day  leflunomide 10 mg oral tablet: 2 tab(s) orally once a day  megestrol 20 mg oral tablet: 1 tab(s) orally once a day  memantine 10 mg oral tablet: 1 tab(s) orally 2 times a day  metoprolol succinate 50 mg oral tablet, extended release: 1 tab(s) orally once a day  polyethylene glycol 3350 oral powder for reconstitution: 17 gram(s) orally once a day as needed for  constipation  sertraline 100 mg oral tablet: 1 tab(s) orally once a day

## 2024-03-19 NOTE — PROGRESS NOTE ADULT - ASSESSMENT
78y PMHx of dementia, AFib on eliquis, RA, HTN, HLD seen as a trauma alert s/p mechanical fall at [facility]    A/P:   Mechanical Fall:   Orthostatic Hypotension:   Trauma workup negative, seen by trauma team   EKG Afib rate controlled, Trop negative   rEEG normal  Orthostatic BP mildly positive  Fall precaution.     Chronic Atrial Fibrillation:   Continue Metoprolol and Eliquis.     Left thyroid nodule  Incidental finding: nodule with  calcification. 1.8 x 0.9 cm prevascular mediastinal nodule (3/44).  TSH 1.24    Pleural opacity   Incidental finding: 1.9 x 1.4 x 5.5 cm pleural-based opacity, medial right lower lobe is present   Follow-up CT scan in 3-6 months time recommended.    HTN Continue losartan, amlodipine  HLD: Continue Lipitor.     Dementia:  memantine   Discharge back to NH tomorrow.

## 2024-03-19 NOTE — PROGRESS NOTE ADULT - ASSESSMENT
Patient is a 78y PMHx of dementia, AFib on eliquis, RA, HTN, HLD seen as a trauma alert s/p mechanical fall at [facility]    #Fall likely mechanical  - HD stable, afebrile   - Unsure LOC, on AC, +Head strike   - Trauma workup negative, seen by trauma team   - Electrolytes, glucose wnl   - ECG: Afib rate controlled, Trop negative   - rEEG normal  - orthostatic vitals positive 144/75 lying to 125/81 sitting, to 117/70 standing   - pain control PRN   - PT/OT eval appreciated - OTTO  - If any change in mental status repeat CTH     #Chronic AFib   - Rate controlled  - c/w eliquis, metoprolol     #Left thyroid nodule  - Incidental finding: nodule with  calcification. 1.8 x 0.9 cm prevascular mediastinal nodule (3/44).  - TSH 1.24  - Follow up outpatient    #Pleural opacity   - Incidental finding: 1.9 x 1.4 x 5.5 cm pleural-based opacity, medial right lower lobe is present (series 3/58, 9/231).  - Follow-up CT scan in 3-6 months time recommended.    #HTN   #HLD  - c/w statin, losartan, amlodipine  - Hold BP meds if hypotensive     #HO dementia   - c/w memantine     #Misc  DVT: Eliquis   Diet: DASH   Activity: IAT    Patient is a 78y PMHx of dementia, AFib on eliquis, RA, HTN, HLD seen as a trauma alert s/p mechanical fall at [facility]    #Fall likely mechanical  - HD stable, afebrile   - Unsure LOC, on AC, +Head strike   - Trauma workup negative, seen by trauma team   - Electrolytes, glucose wnl   - ECG: Afib rate controlled, Trop negative   - rEEG normal  - orthostatic vitals positive 144/75 lying to 125/81 sitting, to 117/70 standing   - STOP losartan and reassess orthostatics  - Start Abdominal binder  - pain control PRN   - PT/OT eval appreciated - OTTO  - If any change in mental status repeat CTH     #Chronic AFib   - Rate controlled  - c/w eliquis, metoprolol     #Left thyroid nodule  - Incidental finding: nodule with  calcification. 1.8 x 0.9 cm prevascular mediastinal nodule (3/44).  - TSH 1.24  - Follow up outpatient    #Pleural opacity   - Incidental finding: 1.9 x 1.4 x 5.5 cm pleural-based opacity, medial right lower lobe is present (series 3/58, 9/231).  - Follow-up CT scan in 3-6 months time recommended.    #HTN   #HLD  - c/w statin, losartan, amlodipine  - Hold BP meds if hypotensive     #HO dementia   - c/w memantine     #Misc  DVT: Eliquis   Diet: DASH   Activity: IAT

## 2024-03-20 LAB
ALBUMIN SERPL ELPH-MCNC: 3.8 G/DL — SIGNIFICANT CHANGE UP (ref 3.5–5.2)
ALP SERPL-CCNC: 106 U/L — SIGNIFICANT CHANGE UP (ref 30–115)
ALT FLD-CCNC: 12 U/L — SIGNIFICANT CHANGE UP (ref 0–41)
ANION GAP SERPL CALC-SCNC: 10 MMOL/L — SIGNIFICANT CHANGE UP (ref 7–14)
AST SERPL-CCNC: 20 U/L — SIGNIFICANT CHANGE UP (ref 0–41)
BASOPHILS # BLD AUTO: 0.06 K/UL — SIGNIFICANT CHANGE UP (ref 0–0.2)
BASOPHILS NFR BLD AUTO: 0.8 % — SIGNIFICANT CHANGE UP (ref 0–1)
BILIRUB SERPL-MCNC: 0.4 MG/DL — SIGNIFICANT CHANGE UP (ref 0.2–1.2)
BUN SERPL-MCNC: 34 MG/DL — HIGH (ref 10–20)
CALCIUM SERPL-MCNC: 9.5 MG/DL — SIGNIFICANT CHANGE UP (ref 8.4–10.5)
CHLORIDE SERPL-SCNC: 108 MMOL/L — SIGNIFICANT CHANGE UP (ref 98–110)
CO2 SERPL-SCNC: 24 MMOL/L — SIGNIFICANT CHANGE UP (ref 17–32)
CREAT SERPL-MCNC: 1.2 MG/DL — SIGNIFICANT CHANGE UP (ref 0.7–1.5)
EGFR: 47 ML/MIN/1.73M2 — LOW
EOSINOPHIL # BLD AUTO: 0.35 K/UL — SIGNIFICANT CHANGE UP (ref 0–0.7)
EOSINOPHIL NFR BLD AUTO: 4.7 % — SIGNIFICANT CHANGE UP (ref 0–8)
GLUCOSE SERPL-MCNC: 93 MG/DL — SIGNIFICANT CHANGE UP (ref 70–99)
HCT VFR BLD CALC: 34.3 % — LOW (ref 37–47)
HGB BLD-MCNC: 11.2 G/DL — LOW (ref 12–16)
IMM GRANULOCYTES NFR BLD AUTO: 0.3 % — SIGNIFICANT CHANGE UP (ref 0.1–0.3)
LYMPHOCYTES # BLD AUTO: 2.43 K/UL — SIGNIFICANT CHANGE UP (ref 1.2–3.4)
LYMPHOCYTES # BLD AUTO: 32.6 % — SIGNIFICANT CHANGE UP (ref 20.5–51.1)
MAGNESIUM SERPL-MCNC: 2 MG/DL — SIGNIFICANT CHANGE UP (ref 1.8–2.4)
MCHC RBC-ENTMCNC: 30.8 PG — SIGNIFICANT CHANGE UP (ref 27–31)
MCHC RBC-ENTMCNC: 32.7 G/DL — SIGNIFICANT CHANGE UP (ref 32–37)
MCV RBC AUTO: 94.2 FL — SIGNIFICANT CHANGE UP (ref 81–99)
MONOCYTES # BLD AUTO: 0.84 K/UL — HIGH (ref 0.1–0.6)
MONOCYTES NFR BLD AUTO: 11.3 % — HIGH (ref 1.7–9.3)
NEUTROPHILS # BLD AUTO: 3.75 K/UL — SIGNIFICANT CHANGE UP (ref 1.4–6.5)
NEUTROPHILS NFR BLD AUTO: 50.3 % — SIGNIFICANT CHANGE UP (ref 42.2–75.2)
NRBC # BLD: 0 /100 WBCS — SIGNIFICANT CHANGE UP (ref 0–0)
PLATELET # BLD AUTO: 180 K/UL — SIGNIFICANT CHANGE UP (ref 130–400)
PMV BLD: 11.9 FL — HIGH (ref 7.4–10.4)
POTASSIUM SERPL-MCNC: 4.1 MMOL/L — SIGNIFICANT CHANGE UP (ref 3.5–5)
POTASSIUM SERPL-SCNC: 4.1 MMOL/L — SIGNIFICANT CHANGE UP (ref 3.5–5)
PROT SERPL-MCNC: 6.6 G/DL — SIGNIFICANT CHANGE UP (ref 6–8)
RBC # BLD: 3.64 M/UL — LOW (ref 4.2–5.4)
RBC # FLD: 13.3 % — SIGNIFICANT CHANGE UP (ref 11.5–14.5)
SODIUM SERPL-SCNC: 142 MMOL/L — SIGNIFICANT CHANGE UP (ref 135–146)
WBC # BLD: 7.45 K/UL — SIGNIFICANT CHANGE UP (ref 4.8–10.8)
WBC # FLD AUTO: 7.45 K/UL — SIGNIFICANT CHANGE UP (ref 4.8–10.8)

## 2024-03-20 PROCEDURE — 99232 SBSQ HOSP IP/OBS MODERATE 35: CPT

## 2024-03-20 RX ORDER — OLANZAPINE 15 MG/1
2.5 TABLET, FILM COATED ORAL ONCE
Refills: 0 | Status: COMPLETED | OUTPATIENT
Start: 2024-03-20 | End: 2024-03-20

## 2024-03-20 RX ADMIN — APIXABAN 5 MILLIGRAM(S): 2.5 TABLET, FILM COATED ORAL at 06:38

## 2024-03-20 RX ADMIN — Medication 50 MILLIGRAM(S): at 06:38

## 2024-03-20 RX ADMIN — POLYETHYLENE GLYCOL 3350 17 GRAM(S): 17 POWDER, FOR SOLUTION ORAL at 21:44

## 2024-03-20 RX ADMIN — APIXABAN 5 MILLIGRAM(S): 2.5 TABLET, FILM COATED ORAL at 17:25

## 2024-03-20 RX ADMIN — MEGESTROL ACETATE 20 MILLIGRAM(S): 40 SUSPENSION ORAL at 12:28

## 2024-03-20 RX ADMIN — MEMANTINE HYDROCHLORIDE 10 MILLIGRAM(S): 10 TABLET ORAL at 06:38

## 2024-03-20 RX ADMIN — SERTRALINE 100 MILLIGRAM(S): 25 TABLET, FILM COATED ORAL at 12:29

## 2024-03-20 RX ADMIN — AMLODIPINE BESYLATE 5 MILLIGRAM(S): 2.5 TABLET ORAL at 06:38

## 2024-03-20 RX ADMIN — ATORVASTATIN CALCIUM 40 MILLIGRAM(S): 80 TABLET, FILM COATED ORAL at 21:44

## 2024-03-20 RX ADMIN — MEMANTINE HYDROCHLORIDE 10 MILLIGRAM(S): 10 TABLET ORAL at 17:25

## 2024-03-20 NOTE — PROGRESS NOTE ADULT - ASSESSMENT
Patient is a 78y PMHx of dementia, AFib on eliquis, RA, HTN, HLD seen as a trauma alert s/p mechanical fall at [facility]    #Fall likely mechanical  #Orthostatic hypotension  - HD stable, afebrile   - Unsure LOC, on AC, +Head strike   - Trauma workup negative, seen by trauma team   - ECG: Afib rate controlled, Trop negative   - rEEG normal  - orthostatic vitals positive 144/75 lying to 125/81 sitting, to 117/70 standing   - STOP losartan and follow orthostatic negative  - PT/OT eval appreciated - OTTO  - If any change in mental status repeat CTH     #Chronic AFib   - Rate controlled  - c/w eliquis, metoprolol     #Left thyroid nodule  - Incidental finding: nodule with  calcification. 1.8 x 0.9 cm prevascular mediastinal nodule (3/44).  - TSH 1.24  - Follow up outpatient    #Pleural opacity   - Incidental finding: 1.9 x 1.4 x 5.5 cm pleural-based opacity, medial right lower lobe is present (series 3/58, 9/231).  - Follow-up CT scan in 3-6 months time recommended.    #HTN   #HLD  - c/w statin, amlodipine  - Stop losartan for orthostatics    #HO dementia   - c/w memantine     #Misc  DVT: Eliquis   Diet: DASH   Activity: IAT

## 2024-03-20 NOTE — PROGRESS NOTE ADULT - ASSESSMENT
78y PMHx of dementia, AFib on eliquis, RA, HTN, HLD seen as a trauma alert s/p mechanical fall at [facility]    A/P:   Mechanical Fall:   Orthostatic Hypotension:   Trauma workup negative, seen by trauma team   EKG Afib rate controlled, Trop negative   rEEG normal  Orthostatic BP positive, discontinue Losartan.  Fall precaution.     Chronic Atrial Fibrillation:   Continue Metoprolol and Eliquis.     HTN:   BP is stable, discontinue Losartan as BP on the lower side, continue Amlodipine and Metoprolol.     Left thyroid nodule  Incidental finding: nodule with  calcification. 1.8 x 0.9 cm prevascular mediastinal nodule (3/44).  TSH 1.24    Pleural opacity:   Incidental finding: 1.9 x 1.4 x 5.5 cm pleural-based opacity, medial right lower lobe is present   Follow-up CT scan in 3-6 months time recommended.    HLD: Continue Lipitor.     Dementia:  memantine   Discharge back to NH today.

## 2024-03-21 ENCOUNTER — TRANSCRIPTION ENCOUNTER (OUTPATIENT)
Age: 79
End: 2024-03-21

## 2024-03-21 VITALS
TEMPERATURE: 98 F | DIASTOLIC BLOOD PRESSURE: 61 MMHG | RESPIRATION RATE: 18 BRPM | HEART RATE: 79 BPM | SYSTOLIC BLOOD PRESSURE: 107 MMHG

## 2024-03-21 LAB
ANION GAP SERPL CALC-SCNC: 12 MMOL/L — SIGNIFICANT CHANGE UP (ref 7–14)
BUN SERPL-MCNC: 28 MG/DL — HIGH (ref 10–20)
CALCIUM SERPL-MCNC: 9.4 MG/DL — SIGNIFICANT CHANGE UP (ref 8.4–10.5)
CHLORIDE SERPL-SCNC: 110 MMOL/L — SIGNIFICANT CHANGE UP (ref 98–110)
CO2 SERPL-SCNC: 22 MMOL/L — SIGNIFICANT CHANGE UP (ref 17–32)
CREAT SERPL-MCNC: 1.2 MG/DL — SIGNIFICANT CHANGE UP (ref 0.7–1.5)
EGFR: 47 ML/MIN/1.73M2 — LOW
GLUCOSE SERPL-MCNC: 105 MG/DL — HIGH (ref 70–99)
HCT VFR BLD CALC: 32.2 % — LOW (ref 37–47)
HGB BLD-MCNC: 10.3 G/DL — LOW (ref 12–16)
MAGNESIUM SERPL-MCNC: 2.1 MG/DL — SIGNIFICANT CHANGE UP (ref 1.8–2.4)
MCHC RBC-ENTMCNC: 30.5 PG — SIGNIFICANT CHANGE UP (ref 27–31)
MCHC RBC-ENTMCNC: 32 G/DL — SIGNIFICANT CHANGE UP (ref 32–37)
MCV RBC AUTO: 95.3 FL — SIGNIFICANT CHANGE UP (ref 81–99)
NRBC # BLD: 0 /100 WBCS — SIGNIFICANT CHANGE UP (ref 0–0)
PLATELET # BLD AUTO: 161 K/UL — SIGNIFICANT CHANGE UP (ref 130–400)
PMV BLD: 11.5 FL — HIGH (ref 7.4–10.4)
POTASSIUM SERPL-MCNC: 4.3 MMOL/L — SIGNIFICANT CHANGE UP (ref 3.5–5)
POTASSIUM SERPL-SCNC: 4.3 MMOL/L — SIGNIFICANT CHANGE UP (ref 3.5–5)
RBC # BLD: 3.38 M/UL — LOW (ref 4.2–5.4)
RBC # FLD: 13.4 % — SIGNIFICANT CHANGE UP (ref 11.5–14.5)
SODIUM SERPL-SCNC: 144 MMOL/L — SIGNIFICANT CHANGE UP (ref 135–146)
WBC # BLD: 7.69 K/UL — SIGNIFICANT CHANGE UP (ref 4.8–10.8)
WBC # FLD AUTO: 7.69 K/UL — SIGNIFICANT CHANGE UP (ref 4.8–10.8)

## 2024-03-21 PROCEDURE — 99239 HOSP IP/OBS DSCHRG MGMT >30: CPT

## 2024-03-21 RX ADMIN — APIXABAN 5 MILLIGRAM(S): 2.5 TABLET, FILM COATED ORAL at 06:56

## 2024-03-21 RX ADMIN — MEGESTROL ACETATE 20 MILLIGRAM(S): 40 SUSPENSION ORAL at 11:15

## 2024-03-21 RX ADMIN — SERTRALINE 100 MILLIGRAM(S): 25 TABLET, FILM COATED ORAL at 11:16

## 2024-03-21 RX ADMIN — AMLODIPINE BESYLATE 5 MILLIGRAM(S): 2.5 TABLET ORAL at 06:56

## 2024-03-21 RX ADMIN — OLANZAPINE 2.5 MILLIGRAM(S): 15 TABLET, FILM COATED ORAL at 00:08

## 2024-03-21 RX ADMIN — MEMANTINE HYDROCHLORIDE 10 MILLIGRAM(S): 10 TABLET ORAL at 06:56

## 2024-03-21 RX ADMIN — Medication 50 MILLIGRAM(S): at 06:55

## 2024-03-21 NOTE — DISCHARGE NOTE NURSING/CASE MANAGEMENT/SOCIAL WORK - NSDCPEFALRISK_GEN_ALL_CORE
For information on Fall & Injury Prevention, visit: [email or web address] OR  [email or web address] OR  https://www.cdc.gov/steadi/patient.html

## 2024-03-21 NOTE — PROGRESS NOTE ADULT - ASSESSMENT
Patient is a 78y PMHx of dementia, AFib on eliquis, RA, HTN, HLD seen as a trauma alert s/p mechanical fall at [facility]    #Fall likely mechanical  #Orthostatic hypotension  - HD stable, afebrile   - Unsure LOC, on AC, +Head strike   - Trauma workup negative, seen by trauma team   - ECG: Afib rate controlled, Trop negative   - rEEG normal  - orthostatic vitals positive 144/75 lying to 125/81 sitting, to 117/70 standing   - STOPPED losartan and follow up orthostatic NEGATIVE  - PT/OT eval appreciated - OTTO    #Chronic AFib   - Rate controlled  - c/w eliquis, metoprolol     #Left thyroid nodule  - Incidental finding: nodule with  calcification. 1.8 x 0.9 cm prevascular mediastinal nodule (3/44).  - TSH 1.24  - Follow up outpatient    #Pleural opacity   - Incidental finding: 1.9 x 1.4 x 5.5 cm pleural-based opacity, medial right lower lobe is present (series 3/58, 9/231).  - Follow-up CT scan in 3-6 months time recommended.    #HTN   #HLD  - c/w statin, amlodipine  - Stopped losartan for orthostatics  - Follow up outpatient    #HO dementia   - c/w memantine     #Misc  DVT: Eliquis   Diet: DASH   Activity: IAT

## 2024-03-21 NOTE — PROGRESS NOTE ADULT - ASSESSMENT
78y PMHx of dementia, AFib on eliquis, RA, HTN, HLD seen as a trauma alert s/p mechanical fall at [facility]    A/P:   Mechanical Fall:   Orthostatic Hypotension:   Trauma workup negative, seen by trauma team   EKG Afib rate controlled, Trop negative   rEEG normal  Orthostatic BP positive, discontinue Losartan.  Fall precaution.     Chronic Atrial Fibrillation:   Continue Metoprolol and Eliquis.     HTN:   BP is stable, discontinue Losartan as BP on the lower side, continue Amlodipine and Metoprolol.     Left thyroid nodule  Incidental finding: nodule with  calcification. 1.8 x 0.9 cm prevascular mediastinal nodule (3/44).  TSH 1.24    Pleural opacity:   Incidental finding: 1.9 x 1.4 x 5.5 cm pleural-based opacity, medial right lower lobe is present   Follow-up CT scan in 3-6 months time recommended.    HLD: Continue Lipitor.     Dementia:  memantine   Discharge back to NH today.

## 2024-03-21 NOTE — PROGRESS NOTE ADULT - SUBJECTIVE AND OBJECTIVE BOX
AMBROSIO DOWNING  78y  Female      Patient is a 78y old  Female who presents with a chief complaint of Syncope (19 Mar 2024 14:19)      INTERVAL HPI/OVERNIGHT EVENTS:  She feels ok, quite, no new complaints.   Vital Signs Last 24 Hrs  T(C): 35.9 (20 Mar 2024 13:07), Max: 37.2 (19 Mar 2024 19:26)  T(F): 96.7 (20 Mar 2024 13:07), Max: 98.9 (19 Mar 2024 19:26)  HR: 78 (20 Mar 2024 13:07) (77 - 84)  BP: 109/65 (20 Mar 2024 13:07) (88/54 - 142/79)  BP(mean): --  RR: 18 (20 Mar 2024 13:07) (16 - 18)  SpO2: 98% (20 Mar 2024 08:18) (98% - 98%)    Parameters below as of 20 Mar 2024 08:18  Patient On (Oxygen Delivery Method): room air          03-19-24 @ 07:01  - 03-20-24 @ 07:00  --------------------------------------------------------  IN: 221 mL / OUT: 0 mL / NET: 221 mL    03-20-24 @ 07:01  - 03-20-24 @ 13:28  --------------------------------------------------------  IN: 502 mL / OUT: 0 mL / NET: 502 mL            Consultant(s) Notes Reviewed:  [x ] YES  [ ] NO          MEDICATIONS  (STANDING):  amLODIPine   Tablet 5 milliGRAM(s) Oral daily  apixaban 5 milliGRAM(s) Oral two times a day  atorvastatin 40 milliGRAM(s) Oral at bedtime  megestrol 20 milliGRAM(s) Oral daily  memantine 10 milliGRAM(s) Oral two times a day  metoprolol succinate ER 50 milliGRAM(s) Oral daily  polyethylene glycol 3350 17 Gram(s) Oral daily  sertraline 100 milliGRAM(s) Oral daily    MEDICATIONS  (PRN):  acetaminophen     Tablet .. 650 milliGRAM(s) Oral every 6 hours PRN Moderate Pain (4 - 6)      LABS                          11.2   7.45  )-----------( 180      ( 20 Mar 2024 06:06 )             34.3     03-20    142  |  108  |  34<H>  ----------------------------<  93  4.1   |  24  |  1.2    Ca    9.5      20 Mar 2024 06:06  Mg     2.0     03-20    TPro  6.6  /  Alb  3.8  /  TBili  0.4  /  DBili  x   /  AST  20  /  ALT  12  /  AlkPhos  106  03-20      Urinalysis Basic - ( 20 Mar 2024 06:06 )    Color: x / Appearance: x / SG: x / pH: x  Gluc: 93 mg/dL / Ketone: x  / Bili: x / Urobili: x   Blood: x / Protein: x / Nitrite: x   Leuk Esterase: x / RBC: x / WBC x   Sq Epi: x / Non Sq Epi: x / Bacteria: x        Lactate Trend  03-17 @ 15:58 Lactate:1.0         CAPILLARY BLOOD GLUCOSE            RADIOLOGY & ADDITIONAL TESTS:    Imaging Personally Reviewed:  [ ] YES  [ ] NO    HEALTH ISSUES - PROBLEM Dx:          PHYSICAL EXAM:  GENERAL: awake, confused.   HEAD:  Atraumatic, Normocephalic.  EYES: EOMI, PERRLA, conjunctiva and sclera clear.  NECK: Supple, No JVD.  CHEST/LUNG: Clear to auscultation bilaterally; No wheeze.  HEART: Irregular rate and rhythm; S1 S2.   ABDOMEN: Soft, Nontender, Nondistended; Bowel sounds present.  EXTREMITIES:  2+ Peripheral Pulses, No clubbing, cyanosis, or edema.  PSYCH: demented  NEUROLOGY: non-focal.  SKIN: No rashes or lesions.
24H events:    Patient is a 78y old Female who presents with a chief complaint of Syncope (18 Mar 2024 00:20)    Primary diagnosis of Syncope      Today is 2d of hospitalization. This morning patient was seen and examined at bedside, resting comfortably in bed.  No acute or major events overnight.     Code Status: Full      PAST MEDICAL & SURGICAL HISTORY    SOCIAL HISTORY:  Social History:      ALLERGIES:  Decadron (Unknown)    MEDICATIONS:  STANDING MEDICATIONS  amLODIPine   Tablet 5 milliGRAM(s) Oral daily  apixaban 5 milliGRAM(s) Oral two times a day  atorvastatin 40 milliGRAM(s) Oral at bedtime  losartan 50 milliGRAM(s) Oral daily  megestrol 20 milliGRAM(s) Oral daily  memantine 10 milliGRAM(s) Oral two times a day  metoprolol succinate ER 50 milliGRAM(s) Oral daily  polyethylene glycol 3350 17 Gram(s) Oral daily  sertraline 100 milliGRAM(s) Oral daily    PRN MEDICATIONS  acetaminophen     Tablet .. 650 milliGRAM(s) Oral every 6 hours PRN    VITALS:   T(F): 97.1  HR: 75  BP: 142/75  RR: 18  SpO2: 98%    PHYSICAL EXAM:  GENERAL:   ( x) NAD, lying in bed comfortably     (  ) obtunded     (  ) lethargic     (  ) somnolent    HEAD:   ( x) Atraumatic     (  ) hematoma     (  ) laceration (specify location:       )     NECK:  (x) Supple     (  ) neck stiffness     (  ) nuchal rigidity     (  )  no JVD     (  ) JVD present ( -- cm)    HEART:  Rate -->     (x) normal rate     (  ) bradycardic     (  ) tachycardic  Rhythm -->     (x) regular     (  ) regularly irregular     (  ) irregularly irregular  Murmurs -->     (x) normal s1s2     (  ) systolic murmur     (  ) diastolic murmur     (  ) continuous murmur      (  ) S3 present     (  ) S4 present    LUNGS:   ( x)Unlabored respirations     (  ) tachypnea  ( x) B/L air entry     (  ) decreased breath sounds in:  (location     )    ( x) no adventitious sound     (  ) crackles     (  ) wheezing      (  ) rhonchi      (specify location:       )  (  ) chest wall tenderness (specify location:       )    ABDOMEN:   ( x) Soft     (  ) tense   |   (X  ) nondistended     (  ) distended   |   ( X ) +BS     (  ) hypoactive bowel sounds     (  ) hyperactive bowel sounds  ( x) nontender     (  ) RUQ tenderness     (  ) RLQ tenderness     (  ) LLQ tenderness     (  ) epigastric tenderness     (  ) diffuse tenderness  (  ) Splenomegaly      (  ) Hepatomegaly      (  ) Jaundice     (  ) ecchymosis     EXTREMITIES:  ( x) Normal     (  ) Rash     (  ) ecchymosis     (  ) varicose veins      (  ) pitting edema     (  ) non-pitting edema   (  ) ulceration     (  ) gangrene:     (location:     )    NERVOUS SYSTEM:    ( x) A&Ox3     (  ) confused     (  ) lethargic  CN II-XII:     (  ) Intact     (  ) deficits found     (Specify:     )   Upper extremities:     (  ) no sensorimotor deficits     (  ) weakness     (  ) loss of proprioception/vibration     (  ) loss of touch/temperature (specify:    )  Lower extremities:     (  ) no sensorimotor deficits     (  ) weakness     (  ) loss of proprioception/vibration     (  ) loss of touch/temperature (specify:    )    SKIN:   ( X ) No rashes or lesions     (  ) maculopapular rash     (  ) pustules     (  ) vesicles     (  ) ulcer     (  ) ecchymosis     (specify location:     )        LABS:                        11.1   7.59  )-----------( 163      ( 18 Mar 2024 07:06 )             33.7     03-18    139  |  104  |  15  ----------------------------<  79  3.7   |  24  |  1.0    Ca    9.9      18 Mar 2024 07:06  Mg     1.9     03-18    TPro  7.3  /  Alb  4.1  /  TBili  0.8  /  DBili  x   /  AST  20  /  ALT  12  /  AlkPhos  104  03-18    PT/INR - ( 17 Mar 2024 15:58 )   PT: 17.30 sec;   INR: 1.51 ratio         PTT - ( 17 Mar 2024 15:58 )  PTT:40.2 sec  Urinalysis Basic - ( 18 Mar 2024 07:06 )    Color: x / Appearance: x / SG: x / pH: x  Gluc: 79 mg/dL / Ketone: x  / Bili: x / Urobili: x   Blood: x / Protein: x / Nitrite: x   Leuk Esterase: x / RBC: x / WBC x   Sq Epi: x / Non Sq Epi: x / Bacteria: x                RADIOLOGY:                
24H events:    Patient is a 78y old Female who presents with a chief complaint of Syncope (20 Mar 2024 13:59)    Primary diagnosis of Syncope      Today is 4d of hospitalization. This morning patient was seen and examined at bedside, resting comfortably in bed.  No acute or major events overnight.     Code Status: Full      PAST MEDICAL & SURGICAL HISTORY    SOCIAL HISTORY:  Social History:      ALLERGIES:  Decadron (Unknown)    MEDICATIONS:  STANDING MEDICATIONS  amLODIPine   Tablet 5 milliGRAM(s) Oral daily  apixaban 5 milliGRAM(s) Oral two times a day  atorvastatin 40 milliGRAM(s) Oral at bedtime  megestrol 20 milliGRAM(s) Oral daily  memantine 10 milliGRAM(s) Oral two times a day  metoprolol succinate ER 50 milliGRAM(s) Oral daily  polyethylene glycol 3350 17 Gram(s) Oral daily  sertraline 100 milliGRAM(s) Oral daily    PRN MEDICATIONS  acetaminophen     Tablet .. 650 milliGRAM(s) Oral every 6 hours PRN    VITALS:   T(F): 97.9  HR: 95  BP: 125/75  RR: 18  SpO2: 97%    PHYSICAL EXAM:  GENERAL:   ( x) NAD, lying in bed comfortably     (  ) obtunded     (  ) lethargic     (  ) somnolent    HEAD:   ( x) Atraumatic     (  ) hematoma     (  ) laceration (specify location:       )     NECK:  (x) Supple     (  ) neck stiffness     (  ) nuchal rigidity     (  )  no JVD     (  ) JVD present ( -- cm)    HEART:  Rate -->     (x) normal rate     (  ) bradycardic     (  ) tachycardic  Rhythm -->     (x) regular     (  ) regularly irregular     (  ) irregularly irregular  Murmurs -->     (x) normal s1s2     (  ) systolic murmur     (  ) diastolic murmur     (  ) continuous murmur      (  ) S3 present     (  ) S4 present    LUNGS:   ( x)Unlabored respirations     (  ) tachypnea  ( x) B/L air entry     (  ) decreased breath sounds in:  (location     )    ( x) no adventitious sound     (  ) crackles     (  ) wheezing      (  ) rhonchi      (specify location:       )  (  ) chest wall tenderness (specify location:       )    ABDOMEN:   ( x) Soft     (  ) tense   |   (X  ) nondistended     (  ) distended   |   ( X ) +BS     (  ) hypoactive bowel sounds     (  ) hyperactive bowel sounds  ( x) nontender     (  ) RUQ tenderness     (  ) RLQ tenderness     (  ) LLQ tenderness     (  ) epigastric tenderness     (  ) diffuse tenderness  (  ) Splenomegaly      (  ) Hepatomegaly      (  ) Jaundice     (  ) ecchymosis     EXTREMITIES:  ( x) Normal     (  ) Rash     (  ) ecchymosis     (  ) varicose veins      (  ) pitting edema     (  ) non-pitting edema   (  ) ulceration     (  ) gangrene:     (location:     )    NERVOUS SYSTEM:    (  ) A&Ox3     (  ) confused     (  ) lethargic  CN II-XII:     (  ) Intact     (  ) deficits found     (Specify:     )   Upper extremities:     (  ) no sensorimotor deficits     (  ) weakness     (  ) loss of proprioception/vibration     (  ) loss of touch/temperature (specify:    )  Lower extremities:     (  ) no sensorimotor deficits     (  ) weakness     (  ) loss of proprioception/vibration     (  ) loss of touch/temperature (specify:    )    SKIN:   ( X ) No rashes or lesions     (  ) maculopapular rash     (  ) pustules     (  ) vesicles     (  ) ulcer     (  ) ecchymosis     (specify location:     )      LABS:                        11.2   7.45  )-----------( 180      ( 20 Mar 2024 06:06 )             34.3     03-20    142  |  108  |  34<H>  ----------------------------<  93  4.1   |  24  |  1.2    Ca    9.5      20 Mar 2024 06:06  Mg     2.0     03-20    TPro  6.6  /  Alb  3.8  /  TBili  0.4  /  DBili  x   /  AST  20  /  ALT  12  /  AlkPhos  106  03-20      Urinalysis Basic - ( 20 Mar 2024 06:06 )    Color: x / Appearance: x / SG: x / pH: x  Gluc: 93 mg/dL / Ketone: x  / Bili: x / Urobili: x   Blood: x / Protein: x / Nitrite: x   Leuk Esterase: x / RBC: x / WBC x   Sq Epi: x / Non Sq Epi: x / Bacteria: x                RADIOLOGY:                
  AMBROSIO DOWNING  78y  Female      Patient is a 78y old  Female who presents with a chief complaint of Syncope (21 Mar 2024 08:19)      INTERVAL HPI/OVERNIGHT EVENTS:  She feels ok, no new complaints.   Vital Signs Last 24 Hrs  T(C): 36.6 (21 Mar 2024 12:10), Max: 37.2 (20 Mar 2024 19:29)  T(F): 97.8 (21 Mar 2024 12:10), Max: 98.9 (20 Mar 2024 19:29)  HR: 79 (21 Mar 2024 12:10) (77 - 96)  BP: 107/61 (21 Mar 2024 12:10) (107/61 - 125/75)  BP(mean): --  RR: 18 (21 Mar 2024 12:10) (18 - 18)  SpO2: 97% (21 Mar 2024 05:10) (97% - 97%)    Parameters below as of 21 Mar 2024 05:10  Patient On (Oxygen Delivery Method): room air          03-20-24 @ 07:01  -  03-21-24 @ 07:00  --------------------------------------------------------  IN: 502 mL / OUT: 200 mL / NET: 302 mL    03-21-24 @ 07:01  -  03-21-24 @ 15:34  --------------------------------------------------------  IN: 785 mL / OUT: 0 mL / NET: 785 mL            Consultant(s) Notes Reviewed:  [x ] YES  [ ] NO          MEDICATIONS  (STANDING):  amLODIPine   Tablet 5 milliGRAM(s) Oral daily  apixaban 5 milliGRAM(s) Oral two times a day  atorvastatin 40 milliGRAM(s) Oral at bedtime  megestrol 20 milliGRAM(s) Oral daily  memantine 10 milliGRAM(s) Oral two times a day  metoprolol succinate ER 50 milliGRAM(s) Oral daily  polyethylene glycol 3350 17 Gram(s) Oral daily  sertraline 100 milliGRAM(s) Oral daily    MEDICATIONS  (PRN):  acetaminophen     Tablet .. 650 milliGRAM(s) Oral every 6 hours PRN Moderate Pain (4 - 6)      LABS                          10.3   7.69  )-----------( 161      ( 21 Mar 2024 07:38 )             32.2     03-21    144  |  110  |  28<H>  ----------------------------<  105<H>  4.3   |  22  |  1.2    Ca    9.4      21 Mar 2024 07:38  Mg     2.1     03-21    TPro  6.6  /  Alb  3.8  /  TBili  0.4  /  DBili  x   /  AST  20  /  ALT  12  /  AlkPhos  106  03-20      Urinalysis Basic - ( 21 Mar 2024 07:38 )    Color: x / Appearance: x / SG: x / pH: x  Gluc: 105 mg/dL / Ketone: x  / Bili: x / Urobili: x   Blood: x / Protein: x / Nitrite: x   Leuk Esterase: x / RBC: x / WBC x   Sq Epi: x / Non Sq Epi: x / Bacteria: x        Lactate Trend  03-17 @ 15:58 Lactate:1.0         CAPILLARY BLOOD GLUCOSE          Culture - Urine (collected 03-19-24 @ 14:29)  Source: Clean Catch Clean Catch (Midstream)  Preliminary Report (03-21-24 @ 11:07):    >100,000 CFU/ml Escherichia coli        RADIOLOGY & ADDITIONAL TESTS:    Imaging Personally Reviewed:  [ ] YES  [ ] NO    HEALTH ISSUES - PROBLEM Dx:          PHYSICAL EXAM:  GENERAL: awake, confused.   HEAD:  Atraumatic, Normocephalic.  EYES: EOMI, PERRLA, conjunctiva and sclera clear.  NECK: Supple, No JVD.  CHEST/LUNG: Clear to auscultation bilaterally; No wheeze.  HEART: Irregular rate and rhythm; S1 S2.   ABDOMEN: Soft, Nontender, Nondistended; Bowel sounds present.  EXTREMITIES:  2+ Peripheral Pulses, No clubbing, cyanosis, or edema.  PSYCH: demented  NEUROLOGY: non-focal.  SKIN: No rashes or lesions.
  MANSI DOWNINGTA  78y  Female      Patient is a 78y old  Female who presents with a chief complaint of Syncope (19 Mar 2024 08:36)      INTERVAL HPI/OVERNIGHT EVENTS:  She is demented, looks comfortable, denies any complaints.   Vital Signs Last 24 Hrs  T(C): 36.2 (19 Mar 2024 05:00), Max: 36.4 (18 Mar 2024 16:09)  T(F): 97.1 (19 Mar 2024 05:00), Max: 97.6 (18 Mar 2024 20:01)  HR: 75 (19 Mar 2024 05:00) (72 - 80)  BP: 142/75 (19 Mar 2024 05:00) (122/64 - 142/75)  BP(mean): --  RR: 18 (19 Mar 2024 05:00) (18 - 18)  SpO2: 98% (19 Mar 2024 08:28) (98% - 98%)    Parameters below as of 19 Mar 2024 08:28  Patient On (Oxygen Delivery Method): room air          03-19-24 @ 07:01  -  03-19-24 @ 12:46  --------------------------------------------------------  IN: 0 mL / OUT: 0 mL / NET: 0 mL            Consultant(s) Notes Reviewed:  [x ] YES  [ ] NO          MEDICATIONS  (STANDING):  amLODIPine   Tablet 5 milliGRAM(s) Oral daily  apixaban 5 milliGRAM(s) Oral two times a day  atorvastatin 40 milliGRAM(s) Oral at bedtime  losartan 50 milliGRAM(s) Oral daily  megestrol 20 milliGRAM(s) Oral daily  memantine 10 milliGRAM(s) Oral two times a day  metoprolol succinate ER 50 milliGRAM(s) Oral daily  polyethylene glycol 3350 17 Gram(s) Oral daily  sertraline 100 milliGRAM(s) Oral daily    MEDICATIONS  (PRN):  acetaminophen     Tablet .. 650 milliGRAM(s) Oral every 6 hours PRN Moderate Pain (4 - 6)      LABS                          11.1   7.59  )-----------( 163      ( 18 Mar 2024 07:06 )             33.7     03-18    139  |  104  |  15  ----------------------------<  79  3.7   |  24  |  1.0    Ca    9.9      18 Mar 2024 07:06  Mg     1.9     03-18    TPro  7.3  /  Alb  4.1  /  TBili  0.8  /  DBili  x   /  AST  20  /  ALT  12  /  AlkPhos  104  03-18      Urinalysis Basic - ( 18 Mar 2024 07:06 )    Color: x / Appearance: x / SG: x / pH: x  Gluc: 79 mg/dL / Ketone: x  / Bili: x / Urobili: x   Blood: x / Protein: x / Nitrite: x   Leuk Esterase: x / RBC: x / WBC x   Sq Epi: x / Non Sq Epi: x / Bacteria: x      PT/INR - ( 17 Mar 2024 15:58 )   PT: 17.30 sec;   INR: 1.51 ratio         PTT - ( 17 Mar 2024 15:58 )  PTT:40.2 sec  Lactate Trend  03-17 @ 15:58 Lactate:1.0         CAPILLARY BLOOD GLUCOSE      POCT Blood Glucose.: 100 mg/dL (17 Mar 2024 15:33)        RADIOLOGY & ADDITIONAL TESTS:    Imaging Personally Reviewed:  [ ] YES  [ ] NO    HEALTH ISSUES - PROBLEM Dx:          PHYSICAL EXAM:  GENERAL: awake, confused.   HEAD:  Atraumatic, Normocephalic.  EYES: EOMI, PERRLA, conjunctiva and sclera clear.  NECK: Supple, No JVD.  CHEST/LUNG: Clear to auscultation bilaterally; No wheeze.  HEART: Irregular rate and rhythm; S1 S2.   ABDOMEN: Soft, Nontender, Nondistended; Bowel sounds present.  EXTREMITIES:  2+ Peripheral Pulses, No clubbing, cyanosis, or edema.  PSYCH: demented  NEUROLOGY: non-focal.  SKIN: No rashes or lesions.
24H events:    Patient is a 78y old Female who presents with a chief complaint of Syncope (20 Mar 2024 13:25)    Primary diagnosis of Syncope      Day 1:      Today is 3d of hospitalization. This morning patient was seen and examined at bedside, resting comfortably in bed.    No acute or major events overnight. Patient denies fevers, chills, headache, chest pain, dyspnea, cough, nausea, vomiting, abdominal pain or BM changes.     Code Status:    Family communication:  Contact date:  Name of person contacted:  Relationship to patient:  Communication details:  What matters most:    PAST MEDICAL & SURGICAL HISTORY    SOCIAL HISTORY:  Social History:      ALLERGIES:  Decadron (Unknown)    MEDICATIONS:  STANDING MEDICATIONS  amLODIPine   Tablet 5 milliGRAM(s) Oral daily  apixaban 5 milliGRAM(s) Oral two times a day  atorvastatin 40 milliGRAM(s) Oral at bedtime  megestrol 20 milliGRAM(s) Oral daily  memantine 10 milliGRAM(s) Oral two times a day  metoprolol succinate ER 50 milliGRAM(s) Oral daily  polyethylene glycol 3350 17 Gram(s) Oral daily  sertraline 100 milliGRAM(s) Oral daily    PRN MEDICATIONS  acetaminophen     Tablet .. 650 milliGRAM(s) Oral every 6 hours PRN    VITALS:   T(F): 96.7  HR: 78  BP: 109/65  RR: 18  SpO2: 98%    PHYSICAL EXAM:  GENERAL:   ( x) NAD, lying in bed comfortably     (  ) obtunded     (  ) lethargic     (  ) somnolent    HEAD:   ( x) Atraumatic     (  ) hematoma     (  ) laceration (specify location:       )     NECK:  (x) Supple     (  ) neck stiffness     (  ) nuchal rigidity     (  )  no JVD     (  ) JVD present ( -- cm)    HEART:  Rate -->     (x) normal rate     (  ) bradycardic     (  ) tachycardic  Rhythm -->     (x) regular     (  ) regularly irregular     (  ) irregularly irregular  Murmurs -->     (x) normal s1s2     (  ) systolic murmur     (  ) diastolic murmur     (  ) continuous murmur      (  ) S3 present     (  ) S4 present    LUNGS:   ( x)Unlabored respirations     (  ) tachypnea  ( x) B/L air entry     (  ) decreased breath sounds in:  (location     )    ( x) no adventitious sound     (  ) crackles     (  ) wheezing      (  ) rhonchi      (specify location:       )  (  ) chest wall tenderness (specify location:       )    ABDOMEN:   ( x) Soft     (  ) tense   |   (X  ) nondistended     (  ) distended   |   ( X ) +BS     (  ) hypoactive bowel sounds     (  ) hyperactive bowel sounds  ( x) nontender     (  ) RUQ tenderness     (  ) RLQ tenderness     (  ) LLQ tenderness     (  ) epigastric tenderness     (  ) diffuse tenderness  (  ) Splenomegaly      (  ) Hepatomegaly      (  ) Jaundice     (  ) ecchymosis     EXTREMITIES:  ( x) Normal     (  ) Rash     (  ) ecchymosis     (  ) varicose veins      (  ) pitting edema     (  ) non-pitting edema   (  ) ulceration     (  ) gangrene:     (location:     )    NERVOUS SYSTEM:    (  ) A&Ox3     (  ) confused     (  ) lethargic  CN II-XII:     (  ) Intact     (  ) deficits found     (Specify:     )   Upper extremities:     (  ) no sensorimotor deficits     (  ) weakness     (  ) loss of proprioception/vibration     (  ) loss of touch/temperature (specify:    )  Lower extremities:     (  ) no sensorimotor deficits     (  ) weakness     (  ) loss of proprioception/vibration     (  ) loss of touch/temperature (specify:    )    SKIN:   ( X ) No rashes or lesions     (  ) maculopapular rash     (  ) pustules     (  ) vesicles     (  ) ulcer     (  ) ecchymosis     (specify location:     )    LABS:                        11.2   7.45  )-----------( 180      ( 20 Mar 2024 06:06 )             34.3     03-20    142  |  108  |  34<H>  ----------------------------<  93  4.1   |  24  |  1.2    Ca    9.5      20 Mar 2024 06:06  Mg     2.0     03-20    TPro  6.6  /  Alb  3.8  /  TBili  0.4  /  DBili  x   /  AST  20  /  ALT  12  /  AlkPhos  106  03-20      Urinalysis Basic - ( 20 Mar 2024 06:06 )    Color: x / Appearance: x / SG: x / pH: x  Gluc: 93 mg/dL / Ketone: x  / Bili: x / Urobili: x   Blood: x / Protein: x / Nitrite: x   Leuk Esterase: x / RBC: x / WBC x   Sq Epi: x / Non Sq Epi: x / Bacteria: x                RADIOLOGY:

## 2024-03-21 NOTE — DISCHARGE NOTE NURSING/CASE MANAGEMENT/SOCIAL WORK - PATIENT PORTAL LINK FT
You can access the FollowMyHealth Patient Portal offered by [facility] by registering at the following website: [email or web address]. By joining [employer]’s FollowMyHealth portal, you will also be able to view your health information using other applications (apps) compatible with our system.

## 2024-03-27 DIAGNOSIS — E78.5 HYPERLIPIDEMIA, UNSPECIFIED: ICD-10-CM

## 2024-03-27 DIAGNOSIS — I10 ESSENTIAL (PRIMARY) HYPERTENSION: ICD-10-CM

## 2024-03-27 DIAGNOSIS — F03.90 UNSPECIFIED DEMENTIA, UNSPECIFIED SEVERITY, WITHOUT BEHAVIORAL DISTURBANCE, PSYCHOTIC DISTURBANCE, MOOD DISTURBANCE, AND ANXIETY: ICD-10-CM

## 2024-03-27 DIAGNOSIS — M06.9 RHEUMATOID ARTHRITIS, UNSPECIFIED: ICD-10-CM

## 2024-03-27 DIAGNOSIS — Z79.01 LONG TERM (CURRENT) USE OF ANTICOAGULANTS: ICD-10-CM

## 2024-03-27 DIAGNOSIS — R55 SYNCOPE AND COLLAPSE: ICD-10-CM

## 2024-03-27 DIAGNOSIS — Z79.899 OTHER LONG TERM (CURRENT) DRUG THERAPY: ICD-10-CM

## 2024-03-27 DIAGNOSIS — I95.1 ORTHOSTATIC HYPOTENSION: ICD-10-CM

## 2024-03-27 DIAGNOSIS — R91.8 OTHER NONSPECIFIC ABNORMAL FINDING OF LUNG FIELD: ICD-10-CM

## 2024-03-27 DIAGNOSIS — Z11.52 ENCOUNTER FOR SCREENING FOR COVID-19: ICD-10-CM

## 2024-03-27 DIAGNOSIS — I48.20 CHRONIC ATRIAL FIBRILLATION, UNSPECIFIED: ICD-10-CM

## 2024-03-27 DIAGNOSIS — Z91.81 HISTORY OF FALLING: ICD-10-CM

## 2024-03-27 DIAGNOSIS — E04.1 NONTOXIC SINGLE THYROID NODULE: ICD-10-CM

## 2025-07-07 NOTE — ED ADULT NURSE NOTE - NS ED NURSE DC PT EDUCATION RESOURCES
144 03/29/2024     Lab Results   Component Value Date    HDL 45 06/12/2025    HDL 48 11/25/2024    HDL 48 08/16/2024     No components found for: \"LDLCHOLESTEROL\", \"LDLCALC\"  No results found for: \"VLDL\"  No results found for: \"CHOLHDLRATIO\"  CMP:    Lab Results   Component Value Date/Time     06/23/2025 12:22 PM    K 3.9 06/23/2025 12:22 PM    K 4.4 02/28/2022 05:43 AM     06/23/2025 12:22 PM    CO2 26 06/23/2025 12:22 PM    BUN 14 06/23/2025 12:22 PM    CREATININE 1.03 06/23/2025 12:22 PM    GFRAA >60.0 02/28/2022 05:43 AM    LABGLOM 64.5 06/23/2025 12:22 PM    LABGLOM 83.0 03/29/2024 09:09 AM    GLUCOSE 99 06/23/2025 12:22 PM    CALCIUM 9.4 06/23/2025 12:22 PM    BILITOT 0.3 11/25/2024 11:13 AM    ALKPHOS 96 11/25/2024 11:13 AM    AST 18 11/25/2024 11:13 AM    ALT 31 11/25/2024 11:13 AM     BMP:    Lab Results   Component Value Date/Time     06/23/2025 12:22 PM    K 3.9 06/23/2025 12:22 PM    K 4.4 02/28/2022 05:43 AM     06/23/2025 12:22 PM    CO2 26 06/23/2025 12:22 PM    BUN 14 06/23/2025 12:22 PM    CREATININE 1.03 06/23/2025 12:22 PM    CALCIUM 9.4 06/23/2025 12:22 PM    GFRAA >60.0 02/28/2022 05:43 AM    LABGLOM 64.5 06/23/2025 12:22 PM    LABGLOM 83.0 03/29/2024 09:09 AM    GLUCOSE 99 06/23/2025 12:22 PM     Magnesium:    Lab Results   Component Value Date/Time    MG 2.1 01/10/2023 11:23 PM     TSH:  Lab Results   Component Value Date    TSH 0.923 05/13/2025             Patient Active Problem List   Diagnosis    Chronic midline low back pain without sciatica    Depression    Chronic pain of both knees    Lumbar spondylosis    Tricompartment osteoarthritis of left knee    History of tobacco use    ALYSHA (obstructive sleep apnea)    History of colon polyps    Female rectocele with enterocele    Ulcer of abdomen wall (HCC)    Vitamin D deficiency    Type 2 diabetes mellitus with microalbuminuria, without long-term current use of insulin (HCC)    GERD (gastroesophageal reflux disease)    
Yes